# Patient Record
Sex: MALE | Race: WHITE | NOT HISPANIC OR LATINO | Employment: OTHER | URBAN - METROPOLITAN AREA
[De-identification: names, ages, dates, MRNs, and addresses within clinical notes are randomized per-mention and may not be internally consistent; named-entity substitution may affect disease eponyms.]

---

## 2023-05-30 ENCOUNTER — APPOINTMENT (OUTPATIENT)
Dept: CT IMAGING | Facility: HOSPITAL | Age: 68
End: 2023-05-30

## 2023-05-30 ENCOUNTER — HOSPITAL ENCOUNTER (EMERGENCY)
Facility: HOSPITAL | Age: 68
Discharge: HOME | End: 2023-05-30
Attending: EMERGENCY MEDICINE | Admitting: EMERGENCY MEDICINE

## 2023-05-30 VITALS
HEART RATE: 50 BPM | BODY MASS INDEX: 27.94 KG/M2 | TEMPERATURE: 96.9 F | HEIGHT: 67 IN | OXYGEN SATURATION: 96 % | RESPIRATION RATE: 16 BRPM | DIASTOLIC BLOOD PRESSURE: 87 MMHG | SYSTOLIC BLOOD PRESSURE: 154 MMHG | WEIGHT: 178 LBS

## 2023-05-30 DIAGNOSIS — R19.7 DIARRHEA OF PRESUMED INFECTIOUS ORIGIN: Primary | ICD-10-CM

## 2023-05-30 LAB
ALBUMIN SERPL-MCNC: 4.4 G/DL (ref 3.5–5.2)
ALBUMIN/GLOB SERPL: 1.5 G/DL
ALP SERPL-CCNC: 36 U/L (ref 39–117)
ALT SERPL W P-5'-P-CCNC: 26 U/L (ref 1–41)
ANION GAP SERPL CALCULATED.3IONS-SCNC: 8.6 MMOL/L (ref 5–15)
AST SERPL-CCNC: 21 U/L (ref 1–40)
BASOPHILS # BLD AUTO: 0.01 10*3/MM3 (ref 0–0.2)
BASOPHILS NFR BLD AUTO: 0.2 % (ref 0–1.5)
BILIRUB SERPL-MCNC: 0.5 MG/DL (ref 0–1.2)
BILIRUB UR QL STRIP: NEGATIVE
BUN SERPL-MCNC: 21 MG/DL (ref 8–23)
BUN/CREAT SERPL: 25.6 (ref 7–25)
CALCIUM SPEC-SCNC: 9.6 MG/DL (ref 8.6–10.5)
CHLORIDE SERPL-SCNC: 105 MMOL/L (ref 98–107)
CLARITY UR: CLEAR
CO2 SERPL-SCNC: 23.4 MMOL/L (ref 22–29)
COLOR UR: YELLOW
CREAT SERPL-MCNC: 0.82 MG/DL (ref 0.76–1.27)
DEPRECATED RDW RBC AUTO: 44 FL (ref 37–54)
EGFRCR SERPLBLD CKD-EPI 2021: 95.7 ML/MIN/1.73
EOSINOPHIL # BLD AUTO: 0.23 10*3/MM3 (ref 0–0.4)
EOSINOPHIL NFR BLD AUTO: 3.9 % (ref 0.3–6.2)
ERYTHROCYTE [DISTWIDTH] IN BLOOD BY AUTOMATED COUNT: 14 % (ref 12.3–15.4)
GLOBULIN UR ELPH-MCNC: 3 GM/DL
GLUCOSE SERPL-MCNC: 106 MG/DL (ref 65–99)
GLUCOSE UR STRIP-MCNC: NEGATIVE MG/DL
HCT VFR BLD AUTO: 46 % (ref 37.5–51)
HGB BLD-MCNC: 15.5 G/DL (ref 13–17.7)
HGB UR QL STRIP.AUTO: NEGATIVE
IMM GRANULOCYTES # BLD AUTO: 0.01 10*3/MM3 (ref 0–0.05)
IMM GRANULOCYTES NFR BLD AUTO: 0.2 % (ref 0–0.5)
KETONES UR QL STRIP: NEGATIVE
LEUKOCYTE ESTERASE UR QL STRIP.AUTO: NEGATIVE
LIPASE SERPL-CCNC: 20 U/L (ref 13–60)
LYMPHOCYTES # BLD AUTO: 1.74 10*3/MM3 (ref 0.7–3.1)
LYMPHOCYTES NFR BLD AUTO: 29.6 % (ref 19.6–45.3)
MCH RBC QN AUTO: 29 PG (ref 26.6–33)
MCHC RBC AUTO-ENTMCNC: 33.7 G/DL (ref 31.5–35.7)
MCV RBC AUTO: 86.1 FL (ref 79–97)
MONOCYTES # BLD AUTO: 0.44 10*3/MM3 (ref 0.1–0.9)
MONOCYTES NFR BLD AUTO: 7.5 % (ref 5–12)
NEUTROPHILS NFR BLD AUTO: 3.44 10*3/MM3 (ref 1.7–7)
NEUTROPHILS NFR BLD AUTO: 58.6 % (ref 42.7–76)
NITRITE UR QL STRIP: NEGATIVE
NRBC BLD AUTO-RTO: 0 /100 WBC (ref 0–0.2)
PH UR STRIP.AUTO: 5.5 [PH] (ref 5–8)
PLATELET # BLD AUTO: 179 10*3/MM3 (ref 140–450)
PMV BLD AUTO: 11.4 FL (ref 6–12)
POTASSIUM SERPL-SCNC: 4.4 MMOL/L (ref 3.5–5.2)
PROT SERPL-MCNC: 7.4 G/DL (ref 6–8.5)
PROT UR QL STRIP: NEGATIVE
RBC # BLD AUTO: 5.34 10*6/MM3 (ref 4.14–5.8)
SODIUM SERPL-SCNC: 137 MMOL/L (ref 136–145)
SP GR UR STRIP: 1.01 (ref 1–1.03)
UROBILINOGEN UR QL STRIP: NORMAL
WBC NRBC COR # BLD: 5.87 10*3/MM3 (ref 3.4–10.8)

## 2023-05-30 PROCEDURE — 74176 CT ABD & PELVIS W/O CONTRAST: CPT

## 2023-05-30 PROCEDURE — 80053 COMPREHEN METABOLIC PANEL: CPT | Performed by: PHYSICIAN ASSISTANT

## 2023-05-30 PROCEDURE — 83690 ASSAY OF LIPASE: CPT | Performed by: PHYSICIAN ASSISTANT

## 2023-05-30 PROCEDURE — 81003 URINALYSIS AUTO W/O SCOPE: CPT | Performed by: PHYSICIAN ASSISTANT

## 2023-05-30 PROCEDURE — 85025 COMPLETE CBC W/AUTO DIFF WBC: CPT | Performed by: PHYSICIAN ASSISTANT

## 2023-05-30 PROCEDURE — 99283 EMERGENCY DEPT VISIT LOW MDM: CPT

## 2023-05-30 RX ADMIN — SODIUM CHLORIDE 500 ML: 9 INJECTION, SOLUTION INTRAVENOUS at 11:01

## 2023-05-30 NOTE — DISCHARGE INSTRUCTIONS
Stay well-hydrated  Follow-up with your primary care doctor within 2 days for recheck  Schedule your colonoscopy with Dr. Interiano as he recommended  Return to the ER for worsening or as needed

## 2023-05-30 NOTE — ED PROVIDER NOTES
" EMERGENCY DEPARTMENT ENCOUNTER    Room Number:  01/01  Date seen:  5/30/2023  PCP: Yaquelin Ovalle MD  Discussed/ obtained information from independent historians: patient      HPI:  Chief Complaint: diarrhea  A complete HPI/ROS/PMH/PSH/SH/FH are unobtainable due to: none  Context: Andrea Zepeda is a 67 y.o. male who presents to the ED c/o severe watery diarrhea that started yesterday and has continued, recurs with any eating or drinking.  States is nonbloody, has some left sided abdominal discomfort and feels he has a \"knot\" in his left abdomen.  He denies any nausea vomiting, thinks he had a low-grade fever.  Denies urinary symptoms.  Has had multiple prior abdominal surgeries including for Hirschsprung's disease as a child   As well as an appendectomy.  He states he did eat at a fast food restaurant for breakfast but his family ate the same food and are not sick.  He denies any hospitalizations or antibiotics in the last 90 days.      External (non-ED) record review: Urgent care visit from 10/30/2017 Her wrist pain after a fall.  Right wrist x-rays showed no acute findings.  He was diagnosed with a short brain and strain of the right wrist and forearm.      PAST MEDICAL HISTORY  Active Ambulatory Problems     Diagnosis Date Noted   • No Active Ambulatory Problems     Resolved Ambulatory Problems     Diagnosis Date Noted   • No Resolved Ambulatory Problems     Past Medical History:   Diagnosis Date   • Hirschsprung's disease          PAST SURGICAL HISTORY  Past Surgical History:   Procedure Laterality Date   • APPENDECTOMY     • CYST REMOVAL      behind left ear and left cheek   • LAPAROSCOPIC ENDO-RECTAL PULL THROUGH FOR HIRSCHSPRUNG'S DISEASE     • NECK SURGERY           FAMILY HISTORY  History reviewed. No pertinent family history.      SOCIAL HISTORY  Social History     Socioeconomic History   • Marital status:    Tobacco Use   • Smoking status: Every Day   • Smokeless tobacco: Never   Substance " and Sexual Activity   • Alcohol use: Defer   • Drug use: Defer   • Sexual activity: Defer         ALLERGIES  Iodinated contrast media, Morphine and related, and Sulfa antibiotics        REVIEW OF SYSTEMS  Review of Systems         PHYSICAL EXAM  ED Triage Vitals [05/30/23 1007]   Temp Heart Rate Resp BP SpO2   96.9 °F (36.1 °C) 98 16 -- 96 %      Temp src Heart Rate Source Patient Position BP Location FiO2 (%)   Tympanic Monitor -- -- --       Physical Exam      GENERAL: Well-appearing, conversational, no acute distress  HENT: normocephalic, atraumatic  EYES: no scleral icterus  CV: regular rhythm, normal rate  RESPIRATORY: normal effort CTA B  ABDOMEN: nondistended soft, normal bowel sounds no guarding or rigidity, mild left abdominal tenderness  MUSCULOSKELETAL: no deformity  NEURO: alert, moves all extremities, follows commands  PSYCH:  calm, cooperative  SKIN: warm, dry    Vital signs and nursing notes reviewed.          LAB RESULTS  Recent Results (from the past 24 hour(s))   Comprehensive Metabolic Panel    Collection Time: 05/30/23 10:59 AM    Specimen: Blood   Result Value Ref Range    Glucose 106 (H) 65 - 99 mg/dL    BUN 21 8 - 23 mg/dL    Creatinine 0.82 0.76 - 1.27 mg/dL    Sodium 137 136 - 145 mmol/L    Potassium 4.4 3.5 - 5.2 mmol/L    Chloride 105 98 - 107 mmol/L    CO2 23.4 22.0 - 29.0 mmol/L    Calcium 9.6 8.6 - 10.5 mg/dL    Total Protein 7.4 6.0 - 8.5 g/dL    Albumin 4.4 3.5 - 5.2 g/dL    ALT (SGPT) 26 1 - 41 U/L    AST (SGOT) 21 1 - 40 U/L    Alkaline Phosphatase 36 (L) 39 - 117 U/L    Total Bilirubin 0.5 0.0 - 1.2 mg/dL    Globulin 3.0 gm/dL    A/G Ratio 1.5 g/dL    BUN/Creatinine Ratio 25.6 (H) 7.0 - 25.0    Anion Gap 8.6 5.0 - 15.0 mmol/L    eGFR 95.7 >60.0 mL/min/1.73   Lipase    Collection Time: 05/30/23 10:59 AM    Specimen: Blood   Result Value Ref Range    Lipase 20 13 - 60 U/L   CBC Auto Differential    Collection Time: 05/30/23 10:59 AM    Specimen: Blood   Result Value Ref Range    WBC  5.87 3.40 - 10.80 10*3/mm3    RBC 5.34 4.14 - 5.80 10*6/mm3    Hemoglobin 15.5 13.0 - 17.7 g/dL    Hematocrit 46.0 37.5 - 51.0 %    MCV 86.1 79.0 - 97.0 fL    MCH 29.0 26.6 - 33.0 pg    MCHC 33.7 31.5 - 35.7 g/dL    RDW 14.0 12.3 - 15.4 %    RDW-SD 44.0 37.0 - 54.0 fl    MPV 11.4 6.0 - 12.0 fL    Platelets 179 140 - 450 10*3/mm3    Neutrophil % 58.6 42.7 - 76.0 %    Lymphocyte % 29.6 19.6 - 45.3 %    Monocyte % 7.5 5.0 - 12.0 %    Eosinophil % 3.9 0.3 - 6.2 %    Basophil % 0.2 0.0 - 1.5 %    Immature Grans % 0.2 0.0 - 0.5 %    Neutrophils, Absolute 3.44 1.70 - 7.00 10*3/mm3    Lymphocytes, Absolute 1.74 0.70 - 3.10 10*3/mm3    Monocytes, Absolute 0.44 0.10 - 0.90 10*3/mm3    Eosinophils, Absolute 0.23 0.00 - 0.40 10*3/mm3    Basophils, Absolute 0.01 0.00 - 0.20 10*3/mm3    Immature Grans, Absolute 0.01 0.00 - 0.05 10*3/mm3    nRBC 0.0 0.0 - 0.2 /100 WBC   Urinalysis With Microscopic If Indicated (No Culture) - Urine, Clean Catch    Collection Time: 05/30/23 12:25 PM    Specimen: Urine, Clean Catch   Result Value Ref Range    Color, UA Yellow Yellow, Straw    Appearance, UA Clear Clear    pH, UA 5.5 5.0 - 8.0    Specific Gravity, UA 1.014 1.005 - 1.030    Glucose, UA Negative Negative    Ketones, UA Negative Negative    Bilirubin, UA Negative Negative    Blood, UA Negative Negative    Protein, UA Negative Negative    Leuk Esterase, UA Negative Negative    Nitrite, UA Negative Negative    Urobilinogen, UA 0.2 E.U./dL 0.2 - 1.0 E.U./dL       Ordered the above labs and reviewed the results.        RADIOLOGY  CT Abdomen Pelvis Without Contrast    Result Date: 5/30/2023  CT ABDOMEN AND PELVIS WITHOUT IV CONTRAST  HISTORY: 67-year-old male with left-sided abdominal pain and diarrhea. Hirschsprung's disease as a child status post rectal surgery. Appendectomy in the past as well.  TECHNIQUE: Radiation dose reduction techniques were utilized, including automated exposure control and exposure modulation based on body size. 3  mm images were obtained through the abdomen and pelvis without the administration of IV contrast. No priors for comparison.  FINDINGS: Noncontrasted liver, gallbladder, spleen, pancreas appear unremarkable. There is nodular hyperplasia of both adrenal glands. There is bilateral nephrolithiasis. There are no ureteral stones or hydronephrosis bilaterally. There are no stones within the urinary bladder. No acute bowel abnormalities seen. There is an average volume of formed stool within the colon. No definitive colonic thickening is seen. There is no free fluid or lymphadenopathy. There are moderately extensive abdominal aortic atherosclerotic changes and there is 3.1 cm ectasia at the infrarenal portion.      No acute abnormality is seen.        Ordered the above noted radiological studies. Reviewed by me in PACS.            PROCEDURES  Procedures              MEDICATIONS GIVEN IN ER  Medications   sodium chloride 0.9 % bolus 500 mL (0 mL Intravenous Stopped 5/30/23 1319)                   MEDICAL DECISION MAKING, PROGRESS, and CONSULTS    All labs have been independently reviewed by me.  All radiology studies have been reviewed by me and I have also reviewed the radiology report.   EKG's independently viewed and interpreted by me.  Discussion below represents my analysis of pertinent findings related to patient's condition, differential diagnosis, treatment plan and final disposition.      Additional sources:    - Chronic or social conditions impacting care: History of multiple prior abdominal surgeries related to Hirschsprung's disease          Orders placed during this visit:  Orders Placed This Encounter   Procedures   • Gastrointestinal Panel, PCR - Stool, Per Rectum   • CT Abdomen Pelvis Without Contrast   • Comprehensive Metabolic Panel   • Lipase   • Urinalysis With Microscopic If Indicated (No Culture) - Urine, Clean Catch   • CBC Auto Differential   • CBC & Differential           Differential  diagnosis:  Differential diagnosis includes but is not limited to:  - hepatobiliary pathology such as cholecystitis, cholangitis, and symptomatic cholelithiasis  - Pancreatitis  - Dyspepsia  - Small bowel obstruction  - Appendicitis  - Diverticulitis  - UTI including pyelonephritis  - Ureteral stone  - Zoster  - Colitis, including infectious and ischemic  - Atypical ACS        Additional orders considered but not ordered:  I considered admitting the patient however his work-up is unremarkable, he feels much better, he is tolerating p.o. without difficulty and has had no diarrhea over the course of his 5-hour ER course.      Independent interpretation of labs, radiology studies, and discussions with consultants:  ED Course as of 05/30/23 1503   Tue May 30, 2023   1240 Glucose(!): 106 [KA]   1240 Creatinine: 0.82 [KA]   1240 Lipase: 20 [KA]   1240 WBC: 5.87 [KA]   1240 Hemoglobin: 15.5 [KA]   1240 Nitrite, UA: Negative [KA]   1240 Blood, UA: Negative [KA]   1500 I reassessed the patient.  We reviewed all of his labs and imaging studies including all incidental findings.  He was unable to have a bowel movement in the emergency department.  CT shows no acute intra-abdominal findings, no dehydration, no CT or lab evidence of pancreatitis, abdomen is soft and nondistended with minimal lower abdominal tenderness, likely related to his diarrhea.  At this time he is quite comfortable with discharge, have encouraged him to recheck with his PCP in 2 days and return to the ER for any new or worsening symptoms or any concerns [KA]   1501 . [KA]      ED Course User Index  [KA] Rosita De Guzman PA             Patient was wearing a face mask when I entered the room and they continued to wear a mask throughout their stay in the ED.  I wore PPE, including  gloves, face mask with shield or face mask with goggles whenever I was in the room with patient.     DIAGNOSIS  Final diagnoses:   Diarrhea of presumed infectious origin            Follow Up:  Yaquelin Ovalle MD  [address] [facility] [address]  [phone]    In 2 days        RX:     Medication List      No changes were made to your prescriptions during this visit.         Latest Documented Vital Signs:  As of 15:03 EDT  BP- 154/87 HR- 50 Temp- 96.9 °F (36.1 °C) (Tympanic) O2 sat- 96%              --    Please note that portions of this were completed with a voice recognition program.       Note Disclaimer: At [facility], we believe that sharing information builds trust and better relationships. You are receiving this note because you are receiving care at [facility] or recently visited. It is possible you will see health information before a provider has talked with you about it. This kind of information can be easy to misunderstand. To help you fully understand what it means for your health, we urge you to discuss this note with your provider.           Rosita De Guzman PA  05/30/23 1504

## 2023-08-18 ENCOUNTER — APPOINTMENT (OUTPATIENT)
Dept: CT IMAGING | Facility: HOSPITAL | Age: 68
End: 2023-08-18
Payer: MEDICARE

## 2023-08-18 ENCOUNTER — HOSPITAL ENCOUNTER (OUTPATIENT)
Facility: HOSPITAL | Age: 68
Setting detail: OBSERVATION
LOS: 1 days | Discharge: HOME | End: 2023-08-21
Attending: EMERGENCY MEDICINE | Admitting: INTERNAL MEDICINE
Payer: MEDICARE

## 2023-08-18 DIAGNOSIS — K62.5 RECTAL BLEEDING: Primary | ICD-10-CM

## 2023-08-18 DIAGNOSIS — K52.9 COLITIS: ICD-10-CM

## 2023-08-18 LAB
ABO GROUP BLD: NORMAL
ALBUMIN SERPL-MCNC: 4.7 G/DL (ref 3.5–5.2)
ALBUMIN/GLOB SERPL: 2 G/DL
ALP SERPL-CCNC: 38 U/L (ref 39–117)
ALT SERPL W P-5'-P-CCNC: 30 U/L (ref 1–41)
ANION GAP SERPL CALCULATED.3IONS-SCNC: 10 MMOL/L (ref 5–15)
AST SERPL-CCNC: 27 U/L (ref 1–40)
BASOPHILS # BLD AUTO: 0.03 10*3/MM3 (ref 0–0.2)
BASOPHILS NFR BLD AUTO: 0.3 % (ref 0–1.5)
BILIRUB SERPL-MCNC: 0.6 MG/DL (ref 0–1.2)
BLD GP AB SCN SERPL QL: NEGATIVE
BUN SERPL-MCNC: 17 MG/DL (ref 8–23)
BUN/CREAT SERPL: 21.8 (ref 7–25)
CALCIUM SPEC-SCNC: 9.6 MG/DL (ref 8.6–10.5)
CHLORIDE SERPL-SCNC: 104 MMOL/L (ref 98–107)
CO2 SERPL-SCNC: 24 MMOL/L (ref 22–29)
CREAT SERPL-MCNC: 0.78 MG/DL (ref 0.76–1.27)
DEPRECATED RDW RBC AUTO: 42.8 FL (ref 37–54)
EGFRCR SERPLBLD CKD-EPI 2021: 97.1 ML/MIN/1.73
EOSINOPHIL # BLD AUTO: 0.21 10*3/MM3 (ref 0–0.4)
EOSINOPHIL NFR BLD AUTO: 2.1 % (ref 0.3–6.2)
ERYTHROCYTE [DISTWIDTH] IN BLOOD BY AUTOMATED COUNT: 13.4 % (ref 12.3–15.4)
FERRITIN SERPL-MCNC: 438 NG/ML (ref 30–400)
GLOBULIN UR ELPH-MCNC: 2.4 GM/DL
GLUCOSE SERPL-MCNC: 101 MG/DL (ref 65–99)
HCT VFR BLD AUTO: 46 % (ref 37.5–51)
HGB BLD-MCNC: 15.4 G/DL (ref 13–17.7)
HOLD SPECIMEN: NORMAL
HOLD SPECIMEN: NORMAL
IMM GRANULOCYTES # BLD AUTO: 0.03 10*3/MM3 (ref 0–0.05)
IMM GRANULOCYTES NFR BLD AUTO: 0.3 % (ref 0–0.5)
INR PPP: 1 (ref 0.9–1.1)
IRON 24H UR-MRATE: 58 MCG/DL (ref 59–158)
IRON SATN MFR SERPL: 17 % (ref 20–50)
LIPASE SERPL-CCNC: 13 U/L (ref 13–60)
LYMPHOCYTES # BLD AUTO: 2.11 10*3/MM3 (ref 0.7–3.1)
LYMPHOCYTES NFR BLD AUTO: 21.2 % (ref 19.6–45.3)
MCH RBC QN AUTO: 29.1 PG (ref 26.6–33)
MCHC RBC AUTO-ENTMCNC: 33.5 G/DL (ref 31.5–35.7)
MCV RBC AUTO: 86.8 FL (ref 79–97)
MONOCYTES # BLD AUTO: 0.78 10*3/MM3 (ref 0.1–0.9)
MONOCYTES NFR BLD AUTO: 7.9 % (ref 5–12)
NEUTROPHILS NFR BLD AUTO: 6.77 10*3/MM3 (ref 1.7–7)
NEUTROPHILS NFR BLD AUTO: 68.2 % (ref 42.7–76)
NRBC BLD AUTO-RTO: 0 /100 WBC (ref 0–0.2)
PLATELET # BLD AUTO: 192 10*3/MM3 (ref 140–450)
PMV BLD AUTO: 11.2 FL (ref 6–12)
POTASSIUM SERPL-SCNC: 3.7 MMOL/L (ref 3.5–5.2)
PROT SERPL-MCNC: 7.1 G/DL (ref 6–8.5)
PROTHROMBIN TIME: 13.3 SECONDS (ref 11.7–14.2)
RBC # BLD AUTO: 5.3 10*6/MM3 (ref 4.14–5.8)
RH BLD: NEGATIVE
SODIUM SERPL-SCNC: 138 MMOL/L (ref 136–145)
T&S EXPIRATION DATE: NORMAL
TIBC SERPL-MCNC: 334 MCG/DL (ref 298–536)
TRANSFERRIN SERPL-MCNC: 224 MG/DL (ref 200–360)
WBC NRBC COR # BLD: 9.93 10*3/MM3 (ref 3.4–10.8)
WHOLE BLOOD HOLD COAG: NORMAL
WHOLE BLOOD HOLD SPECIMEN: NORMAL

## 2023-08-18 PROCEDURE — 82728 ASSAY OF FERRITIN: CPT | Performed by: INTERNAL MEDICINE

## 2023-08-18 PROCEDURE — 84466 ASSAY OF TRANSFERRIN: CPT | Performed by: INTERNAL MEDICINE

## 2023-08-18 PROCEDURE — 74176 CT ABD & PELVIS W/O CONTRAST: CPT

## 2023-08-18 PROCEDURE — 99284 EMERGENCY DEPT VISIT MOD MDM: CPT

## 2023-08-18 PROCEDURE — 36415 COLL VENOUS BLD VENIPUNCTURE: CPT | Performed by: INTERNAL MEDICINE

## 2023-08-18 PROCEDURE — 86850 RBC ANTIBODY SCREEN: CPT | Performed by: EMERGENCY MEDICINE

## 2023-08-18 PROCEDURE — 96361 HYDRATE IV INFUSION ADD-ON: CPT

## 2023-08-18 PROCEDURE — 83690 ASSAY OF LIPASE: CPT | Performed by: EMERGENCY MEDICINE

## 2023-08-18 PROCEDURE — G0378 HOSPITAL OBSERVATION PER HR: HCPCS

## 2023-08-18 PROCEDURE — 83540 ASSAY OF IRON: CPT | Performed by: INTERNAL MEDICINE

## 2023-08-18 PROCEDURE — 85025 COMPLETE CBC W/AUTO DIFF WBC: CPT | Performed by: EMERGENCY MEDICINE

## 2023-08-18 PROCEDURE — 80053 COMPREHEN METABOLIC PANEL: CPT | Performed by: EMERGENCY MEDICINE

## 2023-08-18 PROCEDURE — 86901 BLOOD TYPING SEROLOGIC RH(D): CPT | Performed by: EMERGENCY MEDICINE

## 2023-08-18 PROCEDURE — 86900 BLOOD TYPING SEROLOGIC ABO: CPT | Performed by: EMERGENCY MEDICINE

## 2023-08-18 PROCEDURE — 85610 PROTHROMBIN TIME: CPT | Performed by: EMERGENCY MEDICINE

## 2023-08-18 RX ORDER — POLYETHYLENE GLYCOL 3350 17 G/17G
17 POWDER, FOR SOLUTION ORAL DAILY PRN
Status: DISCONTINUED | OUTPATIENT
Start: 2023-08-18 | End: 2023-08-21 | Stop reason: HOSPADM

## 2023-08-18 RX ORDER — METRONIDAZOLE 500 MG/100ML
500 INJECTION, SOLUTION INTRAVENOUS EVERY 8 HOURS
Status: DISCONTINUED | OUTPATIENT
Start: 2023-08-19 | End: 2023-08-21 | Stop reason: HOSPADM

## 2023-08-18 RX ORDER — SODIUM CHLORIDE 9 MG/ML
150 INJECTION, SOLUTION INTRAVENOUS CONTINUOUS
Status: DISCONTINUED | OUTPATIENT
Start: 2023-08-18 | End: 2023-08-18 | Stop reason: SDUPTHER

## 2023-08-18 RX ORDER — ACETAMINOPHEN 325 MG/1
650 TABLET ORAL EVERY 4 HOURS PRN
Status: DISCONTINUED | OUTPATIENT
Start: 2023-08-18 | End: 2023-08-21 | Stop reason: HOSPADM

## 2023-08-18 RX ORDER — BISACODYL 5 MG/1
5 TABLET, DELAYED RELEASE ORAL DAILY PRN
Status: DISCONTINUED | OUTPATIENT
Start: 2023-08-18 | End: 2023-08-21 | Stop reason: HOSPADM

## 2023-08-18 RX ORDER — BISACODYL 10 MG
10 SUPPOSITORY, RECTAL RECTAL DAILY PRN
Status: DISCONTINUED | OUTPATIENT
Start: 2023-08-18 | End: 2023-08-21 | Stop reason: HOSPADM

## 2023-08-18 RX ORDER — VALSARTAN 160 MG/1
160 TABLET ORAL
Status: DISCONTINUED | OUTPATIENT
Start: 2023-08-19 | End: 2023-08-19

## 2023-08-18 RX ORDER — ONDANSETRON 2 MG/ML
4 INJECTION INTRAMUSCULAR; INTRAVENOUS EVERY 6 HOURS PRN
Status: DISCONTINUED | OUTPATIENT
Start: 2023-08-18 | End: 2023-08-21 | Stop reason: HOSPADM

## 2023-08-18 RX ORDER — VALSARTAN 160 MG/1
TABLET ORAL
COMMUNITY
Start: 2023-07-03

## 2023-08-18 RX ORDER — SODIUM CHLORIDE 9 MG/ML
75 INJECTION, SOLUTION INTRAVENOUS CONTINUOUS
Status: DISCONTINUED | OUTPATIENT
Start: 2023-08-19 | End: 2023-08-20

## 2023-08-18 RX ORDER — UREA 10 %
3 LOTION (ML) TOPICAL NIGHTLY PRN
Status: DISCONTINUED | OUTPATIENT
Start: 2023-08-18 | End: 2023-08-21 | Stop reason: HOSPADM

## 2023-08-18 RX ORDER — AMOXICILLIN 250 MG
2 CAPSULE ORAL 2 TIMES DAILY
Status: DISCONTINUED | OUTPATIENT
Start: 2023-08-18 | End: 2023-08-21 | Stop reason: HOSPADM

## 2023-08-18 RX ORDER — ONDANSETRON 4 MG/1
4 TABLET, FILM COATED ORAL EVERY 6 HOURS PRN
Status: DISCONTINUED | OUTPATIENT
Start: 2023-08-18 | End: 2023-08-21 | Stop reason: HOSPADM

## 2023-08-18 RX ADMIN — SODIUM CHLORIDE 150 ML/HR: 9 INJECTION, SOLUTION INTRAVENOUS at 20:00

## 2023-08-18 RX ADMIN — SODIUM CHLORIDE 500 ML: 9 INJECTION, SOLUTION INTRAVENOUS at 13:09

## 2023-08-18 NOTE — ED NOTES
"Nursing report ED to floor  Andrea Zepeda  67 y.o.  male    HPI (triage note):   Andrea Zepeda is a 67 y.o. male who states that yesterday afternoon he began feeling poorly and went inside to rest when he developed left lower quadrant abdominal pain and cramping and then had multiple episodes of watery diarrhea last night.  He states that this morning he got up and had more crampy abdominal pain and then passed bright red blood per rectum.  The patient went to see his PCP this morning who sent him to the emergency room for evaluation.  He states that approximately 6 weeks ago Dr. Shaikh did a colonoscopy on him and was told it was \"normal\".  The patient denies a history of rectal bleeding, anticoagulants, diverticulitis or recent trauma.  He states as a child he had Hirschsprung's disease and had multiple abdominal surgeries in the past.  He denies fevers or chills.  He has had nausea but no vomiting.  He has loss of appetite.  The patient showed me a picture of of bright red blood with clots in his toilet that he took this morning.  Chief Complaint   Patient presents with    Diarrhea    Abdominal Pain       Admitting doctor:   Gayle Moody MD    Admitting diagnosis:   The primary encounter diagnosis was Rectal bleeding. A diagnosis of Colitis was also pertinent to this visit.    Code status:   Current Code Status       Date Active Code Status Order ID Comments User Context       Not on file            Allergies:   Iodinated contrast media, Morphine and related, and Sulfa antibiotics    Past Medical History:  Past Medical History:   Diagnosis Date    Hirschsprung's disease     Hypertension         Weight:       08/18/23  1154   Weight: 80.7 kg (178 lb)       Most recent vitals:   Vitals:    08/18/23 1432 08/18/23 1510 08/18/23 1539 08/18/23 1558   BP: 151/88 152/87 (!) 184/102    BP Location: Left arm      Patient Position:       Pulse: 57 54 63 55   Resp:       Temp:       TempSrc:       SpO2: 96% 96% " 97% 97%   Weight:       Height:           Active LDAs/IV Access:   Lines, Drains & Airways       Active LDAs       Name Placement date Placement time Site Days    Peripheral IV 08/18/23 1229 Right;Posterior Forearm 08/18/23  1229  Forearm  less than 1                    Labs (abnormal labs have a star):   Labs Reviewed   COMPREHENSIVE METABOLIC PANEL - Abnormal; Notable for the following components:       Result Value    Glucose 101 (*)     Alkaline Phosphatase 38 (*)     All other components within normal limits    Narrative:     GFR Normal >60  Chronic Kidney Disease <60  Kidney Failure <15     PROTIME-INR - Normal   LIPASE - Normal   CBC WITH AUTO DIFFERENTIAL - Normal   RAINBOW DRAW    Narrative:     The following orders were created for panel order [place] Draw.  Procedure                               Abnormality         Status                     ---------                               -----------         ------                     Green Top (Gel)[[id number]]                                  Final result               Lavender Top[[id number]]                                     Final result               Gold Top - SST[[id number]]                                   Final result               Light Blue Top[[id number]]                                   Final result                 Please view results for these tests on the individual orders.   TYPE AND SCREEN   GREEN TOP   LAVENDER TOP   GOLD TOP - SST   LIGHT BLUE TOP   CBC AND DIFFERENTIAL    Narrative:     The following orders were created for panel order CBC & Differential.  Procedure                               Abnormality         Status                     ---------                               -----------         ------                     CBC Auto Differential[[id number]]        Normal              Final result                 Please view results for these tests on the individual orders.       EKG:   No orders to display       Meds given in ED:    Medications   sodium chloride 0.9 % bolus 500 mL (0 mL Intravenous Stopped 8/18/23 1339)       Imaging results:  CT Abdomen Pelvis Without Contrast    Result Date: 8/18/2023  Focal moderate to severe colitis involving the splenic flexure and proximal most descending colon. Given focality and watershed area, ischemic colitis is of greatest concern. Evaluation is significantly limited without intravenous contrast. No pneumatosis, portal venous gas or free intraperitoneal air. Consider multiphase contrast-enhanced CT for further evaluation of the mesenteric vasculature.  Above findings were communicated to Dr. Toribio at 3:08 p.m. on 8/18/2023.  This report was finalized on 8/18/2023 3:08 PM by Dr. Teo Roman M.D.       Ambulatory status:   - stand by, uses walking stick    Social issues:   Social History     Socioeconomic History    Marital status:    Tobacco Use    Smoking status: Every Day    Smokeless tobacco: Never   Substance and Sexual Activity    Alcohol use: Defer    Drug use: Defer    Sexual activity: Defer          NIH Stroke Scale:         Ramses Kaye RN  08/18/23 16:05 EDT    Nurse Direct line for any questions: [id number]

## 2023-08-18 NOTE — ED NOTES
Patient presents for BRB in stools onset yesterday. One episode of BRB and one with darker blood. Recent colonoscopy in July with no findings. Denies blood thinners. Nauseated yesterday, but it has resolved. Denies recent fever.

## 2023-08-18 NOTE — ED PROVIDER NOTES
" EMERGENCY DEPARTMENT ENCOUNTER    Room Number:  29/29  Date seen:  8/18/2023  PCP: Yaquelin Ovalle MD  Historian: Patient      HPI:  Chief Complaint: Rectal bleeding  Context: Andrea Zepeda is a 67 y.o. male who states that yesterday afternoon he began feeling poorly and went inside to rest when he developed left lower quadrant abdominal pain and cramping and then had multiple episodes of watery diarrhea last night.  He states that this morning he got up and had more crampy abdominal pain and then passed bright red blood per rectum.  The patient went to see his PCP this morning who sent him to the emergency room for evaluation.  He states that approximately 6 weeks ago Dr. Shaikh did a colonoscopy on him and was told it was \"normal\".  The patient denies a history of rectal bleeding, anticoagulants, diverticulitis or recent trauma.  He states as a child he had Hirschsprung's disease and had multiple abdominal surgeries in the past.  He denies fevers or chills.  He has had nausea but no vomiting.  He has loss of appetite.  The patient showed me a picture of of bright red blood with clots in his toilet that he took this morning.      PAST MEDICAL HISTORY  Active Ambulatory Problems     Diagnosis Date Noted    No Active Ambulatory Problems     Resolved Ambulatory Problems     Diagnosis Date Noted    No Resolved Ambulatory Problems     Past Medical History:   Diagnosis Date    Hirschsprung's disease     Hypertension          REVIEW OF SYSTEMS  All systems reviewed and negative except for those discussed in HPI.       PAST SURGICAL HISTORY  Past Surgical History:   Procedure Laterality Date    APPENDECTOMY      CYST REMOVAL      behind left ear and left cheek    LAPAROSCOPIC ENDO-RECTAL PULL THROUGH FOR HIRSCHSPRUNG'S DISEASE      NECK SURGERY           FAMILY HISTORY  No family history on file.      SOCIAL HISTORY  Social History     Socioeconomic History    Marital status:    Tobacco Use    Smoking status: " Every Day    Smokeless tobacco: Never   Substance and Sexual Activity    Alcohol use: Defer    Drug use: Defer    Sexual activity: Defer         ALLERGIES  Iodinated contrast media, Morphine and related, and Sulfa antibiotics      PHYSICAL EXAM  ED Triage Vitals   Temp Heart Rate Resp BP SpO2   08/18/23 1154 08/18/23 1154 08/18/23 1154 08/18/23 1207 08/18/23 1154   97.6 øF (36.4 øC) 72 16 129/91 96 %      Temp src Heart Rate Source Patient Position BP Location FiO2 (%)   08/18/23 1154 08/18/23 1154 08/18/23 1207 08/18/23 1207 --   Tympanic Monitor Lying Right arm        Physical Exam      GENERAL: 59-year-old male in no acute distress  HENT: NCAT: nares patent: Neck supple  EYES: no scleral icterus  CV: regular rhythm, normal rate  RESPIRATORY: normal effort  ABDOMEN: soft, left lower quadrant tenderness but no guarding and diminished bowel sounds  MUSCULOSKELETAL: no deformity  NEURO: alert with nonfocal neuro exam  PSYCH:  calm, cooperative  SKIN: warm, dry    Vital signs and nursing notes reviewed.      LAB RESULTS  Recent Results (from the past 24 hour(s))   Green Top (Gel)    Collection Time: 08/18/23 12:30 PM   Result Value Ref Range    Extra Tube Hold for add-ons.    Lavender Top    Collection Time: 08/18/23 12:30 PM   Result Value Ref Range    Extra Tube hold for add-on    Gold Top - SST    Collection Time: 08/18/23 12:30 PM   Result Value Ref Range    Extra Tube Hold for add-ons.    Light Blue Top    Collection Time: 08/18/23 12:30 PM   Result Value Ref Range    Extra Tube Hold for add-ons.    Comprehensive Metabolic Panel    Collection Time: 08/18/23 12:30 PM    Specimen: Blood   Result Value Ref Range    Glucose 101 (H) 65 - 99 mg/dL    BUN 17 8 - 23 mg/dL    Creatinine 0.78 0.76 - 1.27 mg/dL    Sodium 138 136 - 145 mmol/L    Potassium 3.7 3.5 - 5.2 mmol/L    Chloride 104 98 - 107 mmol/L    CO2 24.0 22.0 - 29.0 mmol/L    Calcium 9.6 8.6 - 10.5 mg/dL    Total Protein 7.1 6.0 - 8.5 g/dL    Albumin 4.7 3.5 -  5.2 g/dL    ALT (SGPT) 30 1 - 41 U/L    AST (SGOT) 27 1 - 40 U/L    Alkaline Phosphatase 38 (L) 39 - 117 U/L    Total Bilirubin 0.6 0.0 - 1.2 mg/dL    Globulin 2.4 gm/dL    A/G Ratio 2.0 g/dL    BUN/Creatinine Ratio 21.8 7.0 - 25.0    Anion Gap 10.0 5.0 - 15.0 mmol/L    eGFR 97.1 >60.0 mL/min/1.73   Protime-INR    Collection Time: 08/18/23 12:30 PM    Specimen: Blood   Result Value Ref Range    Protime 13.3 11.7 - 14.2 Seconds    INR 1.00 0.90 - 1.10   Lipase    Collection Time: 08/18/23 12:30 PM    Specimen: Blood   Result Value Ref Range    Lipase 13 13 - 60 U/L   CBC Auto Differential    Collection Time: 08/18/23 12:30 PM    Specimen: Blood   Result Value Ref Range    WBC 9.93 3.40 - 10.80 10*3/mm3    RBC 5.30 4.14 - 5.80 10*6/mm3    Hemoglobin 15.4 13.0 - 17.7 g/dL    Hematocrit 46.0 37.5 - 51.0 %    MCV 86.8 79.0 - 97.0 fL    MCH 29.1 26.6 - 33.0 pg    MCHC 33.5 31.5 - 35.7 g/dL    RDW 13.4 12.3 - 15.4 %    RDW-SD 42.8 37.0 - 54.0 fl    MPV 11.2 6.0 - 12.0 fL    Platelets 192 140 - 450 10*3/mm3    Neutrophil % 68.2 42.7 - 76.0 %    Lymphocyte % 21.2 19.6 - 45.3 %    Monocyte % 7.9 5.0 - 12.0 %    Eosinophil % 2.1 0.3 - 6.2 %    Basophil % 0.3 0.0 - 1.5 %    Immature Grans % 0.3 0.0 - 0.5 %    Neutrophils, Absolute 6.77 1.70 - 7.00 10*3/mm3    Lymphocytes, Absolute 2.11 0.70 - 3.10 10*3/mm3    Monocytes, Absolute 0.78 0.10 - 0.90 10*3/mm3    Eosinophils, Absolute 0.21 0.00 - 0.40 10*3/mm3    Basophils, Absolute 0.03 0.00 - 0.20 10*3/mm3    Immature Grans, Absolute 0.03 0.00 - 0.05 10*3/mm3    nRBC 0.0 0.0 - 0.2 /100 WBC   Type & Screen    Collection Time: 08/18/23  1:10 PM    Specimen: Arm, Right; Blood   Result Value Ref Range    ABO Type A     RH type Negative     Antibody Screen Negative     T&S Expiration Date 8/21/2023 11:59:59 PM        Ordered the above labs and reviewed the results.        RADIOLOGY  CT Abdomen Pelvis Without Contrast    Result Date: 8/18/2023  CT ABDOMEN AND PELVIS WITHOUT IV CONTRAST   HISTORY: Bloody diarrhea  TECHNIQUE: Radiation dose reduction techniques were utilized, including automated exposure control and exposure modulation based on body size. 3 mm images were obtained through the abdomen and pelvis without the administration of IV contrast.  COMPARISON: CT abdomen pelvis 5/30/2023  FINDINGS: New focal confluent moderate to severe circumferential wall thickening involving the colonic splenic flexure and proximal most descending colon. Adjacent inflammatory changes in the mesentery. No pneumatosis, portal venous gas or free intraperitoneal air. Remaining bowel is normal in appearance. Severely atherosclerotic abdominal aorta with unchanged infrarenal ectasia (2.9 cm).  Stable small bilateral adrenal adenomas. Multiple bilateral nonobstructing renal calculi. Remaining solid organs are normal. No abdominal pelvic lymphadenopathy. No focal osseous lesion.      Focal moderate to severe colitis involving the splenic flexure and proximal most descending colon. Given focality and watershed area, ischemic colitis is of greatest concern. Evaluation is significantly limited without intravenous contrast. No pneumatosis, portal venous gas or free intraperitoneal air. Consider multiphase contrast-enhanced CT for further evaluation of the mesenteric vasculature.  Above findings were communicated to Dr. Toribio at 3:08 p.m. on 8/18/2023.  This report was finalized on 8/18/2023 3:08 PM by Dr. Teo Roman M.D.       Ordered the above noted radiological studies. Reviewed by me in PACS.            PROCEDURES  Procedures          MEDICATIONS GIVEN IN ER  Medications   sodium chloride 0.9 % bolus 500 mL (0 mL Intravenous Stopped 8/18/23 1339)             MEDICAL DECISION MAKING, PROGRESS, and CONSULTS    All labs have been independently reviewed by me.  All radiology studies have been reviewed by me and I have also reviewed the radiology report.   EKG's independently viewed and interpreted by me.   Discussion below represents my analysis of pertinent findings related to patient's condition, differential diagnosis, treatment plan and final disposition.      Additional sources:  - Shared decision making: After shared decision-making discussion to myself and the patient we agree he needs to be admitted to the hospital for further evaluation and care      Orders placed during this visit:  Orders Placed This Encounter   Procedures    CT Abdomen Pelvis Without Contrast    [place] Draw    Comprehensive Metabolic Panel    Protime-INR    Lipase    CBC Auto Differential    Monitor Blood Pressure    Pulse Oximetry, Continuous    [facility] (on-call MD unless specified) Details    Type & Screen    Initiate Observation Status    Green Top (Gel)    Lavender Top    Gold Top - SST    Light Blue Top    CBC & Differential         Differential diagnosis:  My differential diagnosis includes but is not limited to gastritis, pancreatitis, cholecystitis, appendicitis, diverticulitis, urinary tract infection, kidney stone, or bowel obstruction.        Independent interpretation of labs, radiology studies, and discussions with consultants:  ED Course as of 08/18/23 1741   Fri Aug 18, 2023   1303 The patient is not orthostatic and has normal vital signs at this time.  I will check labs and abdominal CT scan for further evaluation. [GP]   1357 The patient's white count and hemoglobin are normal. [GP]   1518 I discussed the patient's CT scan with the radiologist.  He states the patient does have significant colitis that could be ischemic in nature. [GP]   1518 I will admit the patient to the hospital for further evaluation and care. [GP]   1528 On repeat examination the patient states he is in no pain.  His vital signs are stable.  He had no further bleeding.  I advised him of his colitis and the need for admission to the hospital for further evaluation and care.  The patient understands and agrees with the plan. [GP]   1534 I discussed the  case with Dr. Moody from [facility].  She is aware of the patient's CT showing colitis that could be ischemic.  I have spoken with the patient about his contrast allergy and he tells me that the last time he had IV contrast that his throat swelled shut and he almost .  I have advised Dr. Moody of the above.  She will admit him to the hospital for further evaluation and care. [GP]      ED Course User Index  [GP] Bernardino Toribio MD               DIAGNOSIS  Final diagnoses:   Rectal bleeding   Colitis         DISPOSITION  ADMISSION    Discussed treatment plan and reason for admission with pt/family and admitting physician.  Pt/family voiced understanding of the plan for admission for further testing/treatment as needed.            Latest Documented Vital Signs:  As of 17:41 EDT  BP- 157/83 HR- 57 Temp- 97.6 øF (36.4 øC) (Tympanic) O2 sat- 98%--      --------------------  Please note that portions of this were completed with a voice recognition program.       Note Disclaimer: At [facility], we believe that sharing information builds trust and better relationships. You are receiving this note because you are receiving care at [facility] or recently visited. It is possible you will see health information before a provider has talked with you about it. This kind of information can be easy to misunderstand. To help you fully understand what it means for your health, we urge you to discuss this note with your provider.             Bernardino Toribio MD  [phone]

## 2023-08-19 ENCOUNTER — APPOINTMENT (OUTPATIENT)
Dept: CARDIOLOGY | Facility: HOSPITAL | Age: 68
End: 2023-08-19
Payer: MEDICARE

## 2023-08-19 PROBLEM — K52.9 COLITIS: Status: ACTIVE | Noted: 2023-08-19

## 2023-08-19 PROBLEM — I10 ESSENTIAL HYPERTENSION, BENIGN: Status: ACTIVE | Noted: 2023-08-19

## 2023-08-19 PROBLEM — R10.32 LLQ ABDOMINAL PAIN: Status: ACTIVE | Noted: 2023-08-19

## 2023-08-19 PROBLEM — Q43.1 HIRSCHSPRUNG'S DISEASE: Status: ACTIVE | Noted: 2023-08-19

## 2023-08-19 LAB
ANION GAP SERPL CALCULATED.3IONS-SCNC: 7.3 MMOL/L (ref 5–15)
BH CV VAS SMA AORTA PSV: 70 CM/S
BH CV VAS SMA CELIAC DIST EDV: 44 CM/S
BH CV VAS SMA CELIAC DIST PSV: 197 CM/S
BH CV VAS SMA CELIAC ORIGIN EDV: 49.5 CM/S
BH CV VAS SMA CELIAC ORIGIN PSV: 190 CM/S
BH CV VAS SMA CELIAC PROX EDV: 27.3 CM/S
BH CV VAS SMA CELIAC PROX PSV: 154 CM/S
BH CV VAS SMA HEPATIC EDV: 67 CM/S
BH CV VAS SMA HEPATIC PSV: 237 CM/S
BH CV VAS SMA IMA EDV: 36.7 CM/S
BH CV VAS SMA IMA PSV: 357 CM/S
BH CV VAS SMA ORIGIN EDV: 15.9 CM/S
BH CV VAS SMA ORIGIN PSV: 93.5 CM/S
BH CV VAS SMA SMA DIST EDV: 0 CM/S
BH CV VAS SMA SMA DIST PSV: 133 CM/S
BH CV VAS SMA SMA MID EDV: 20.8 CM/S
BH CV VAS SMA SMA MID PSV: 244 CM/S
BH CV VAS SMA SMA PROX EDV: 40.3 CM/S
BH CV VAS SMA SMA PROX PSV: 240 CM/S
BH CV VAS SMA SPLENIC EDV: 44 CM/S
BH CV VAS SMA SPLENIC PSV: 257 CM/S
BUN SERPL-MCNC: 16 MG/DL (ref 8–23)
BUN/CREAT SERPL: 23.2 (ref 7–25)
CALCIUM SPEC-SCNC: 8.6 MG/DL (ref 8.6–10.5)
CHLORIDE SERPL-SCNC: 108 MMOL/L (ref 98–107)
CO2 SERPL-SCNC: 23.7 MMOL/L (ref 22–29)
CREAT SERPL-MCNC: 0.69 MG/DL (ref 0.76–1.27)
D-LACTATE SERPL-SCNC: 0.9 MMOL/L (ref 0.5–2)
DEPRECATED RDW RBC AUTO: 41.4 FL (ref 37–54)
EGFRCR SERPLBLD CKD-EPI 2021: 100.8 ML/MIN/1.73
ERYTHROCYTE [DISTWIDTH] IN BLOOD BY AUTOMATED COUNT: 13 % (ref 12.3–15.4)
GLUCOSE SERPL-MCNC: 93 MG/DL (ref 65–99)
HCT VFR BLD AUTO: 41.1 % (ref 37.5–51)
HCT VFR BLD AUTO: 42.2 % (ref 37.5–51)
HGB BLD-MCNC: 13.5 G/DL (ref 13–17.7)
HGB BLD-MCNC: 14.1 G/DL (ref 13–17.7)
MCH RBC QN AUTO: 28.7 PG (ref 26.6–33)
MCHC RBC AUTO-ENTMCNC: 32.8 G/DL (ref 31.5–35.7)
MCV RBC AUTO: 87.4 FL (ref 79–97)
PLATELET # BLD AUTO: 164 10*3/MM3 (ref 140–450)
PMV BLD AUTO: 11.5 FL (ref 6–12)
POTASSIUM SERPL-SCNC: 3.7 MMOL/L (ref 3.5–5.2)
RBC # BLD AUTO: 4.7 10*6/MM3 (ref 4.14–5.8)
SODIUM SERPL-SCNC: 139 MMOL/L (ref 136–145)
WBC NRBC COR # BLD: 8.5 10*3/MM3 (ref 3.4–10.8)

## 2023-08-19 PROCEDURE — 93975 VASCULAR STUDY: CPT

## 2023-08-19 PROCEDURE — 87493 C DIFF AMPLIFIED PROBE: CPT | Performed by: INTERNAL MEDICINE

## 2023-08-19 PROCEDURE — 87507 IADNA-DNA/RNA PROBE TQ 12-25: CPT | Performed by: INTERNAL MEDICINE

## 2023-08-19 PROCEDURE — 25010000002 CEFTRIAXONE PER 250 MG: Performed by: INTERNAL MEDICINE

## 2023-08-19 PROCEDURE — 85018 HEMOGLOBIN: CPT | Performed by: INTERNAL MEDICINE

## 2023-08-19 PROCEDURE — G0378 HOSPITAL OBSERVATION PER HR: HCPCS

## 2023-08-19 PROCEDURE — 87040 BLOOD CULTURE FOR BACTERIA: CPT | Performed by: INTERNAL MEDICINE

## 2023-08-19 PROCEDURE — 99204 OFFICE O/P NEW MOD 45 MIN: CPT | Performed by: INTERNAL MEDICINE

## 2023-08-19 PROCEDURE — 85014 HEMATOCRIT: CPT | Performed by: INTERNAL MEDICINE

## 2023-08-19 PROCEDURE — 85027 COMPLETE CBC AUTOMATED: CPT | Performed by: INTERNAL MEDICINE

## 2023-08-19 PROCEDURE — 96361 HYDRATE IV INFUSION ADD-ON: CPT

## 2023-08-19 PROCEDURE — 96365 THER/PROPH/DIAG IV INF INIT: CPT

## 2023-08-19 PROCEDURE — 96367 TX/PROPH/DG ADDL SEQ IV INF: CPT

## 2023-08-19 PROCEDURE — 25010000002 METRONIDAZOLE 500 MG/100ML SOLUTION: Performed by: INTERNAL MEDICINE

## 2023-08-19 PROCEDURE — 83605 ASSAY OF LACTIC ACID: CPT | Performed by: INTERNAL MEDICINE

## 2023-08-19 PROCEDURE — 80048 BASIC METABOLIC PNL TOTAL CA: CPT | Performed by: INTERNAL MEDICINE

## 2023-08-19 RX ADMIN — CEFTRIAXONE SODIUM 1000 MG: 1 INJECTION, POWDER, FOR SOLUTION INTRAMUSCULAR; INTRAVENOUS at 23:27

## 2023-08-19 RX ADMIN — CEFTRIAXONE SODIUM 1000 MG: 1 INJECTION, POWDER, FOR SOLUTION INTRAMUSCULAR; INTRAVENOUS at 00:56

## 2023-08-19 RX ADMIN — METRONIDAZOLE 500 MG: 500 INJECTION, SOLUTION INTRAVENOUS at 09:03

## 2023-08-19 RX ADMIN — METRONIDAZOLE 500 MG: 500 INJECTION, SOLUTION INTRAVENOUS at 23:27

## 2023-08-19 RX ADMIN — METRONIDAZOLE 500 MG: 500 INJECTION, SOLUTION INTRAVENOUS at 01:26

## 2023-08-19 RX ADMIN — METRONIDAZOLE 500 MG: 500 INJECTION, SOLUTION INTRAVENOUS at 15:50

## 2023-08-19 RX ADMIN — SODIUM CHLORIDE 75 ML/HR: 9 INJECTION, SOLUTION INTRAVENOUS at 15:50

## 2023-08-19 RX ADMIN — SODIUM CHLORIDE 75 ML/HR: 9 INJECTION, SOLUTION INTRAVENOUS at 00:15

## 2023-08-19 NOTE — H&P
HISTORY AND PHYSICAL   [facility]        Date of Admission: 2023  Patient Identification:  Name: Andrea Zepeda  Age: 67 y.o.  Sex: male  :  1955  MRN: [id number]                     Primary Care Physician: Yaquelin Ovalle MD    Chief Complaint:  67 year old gentleman who presented to the emergency room with diarrhea and rectal bleeding; he has also had some left lower quadrant pain; he denies fever or chills; he has a history of hirschsprung's disease;     History of Present Illness:   a    Past Medical History:  Past Medical History:   Diagnosis Date    Hirschsprung's disease     Hypertension      Past Surgical History:  Past Surgical History:   Procedure Laterality Date    APPENDECTOMY      CYST REMOVAL      behind left ear and left cheek    LAPAROSCOPIC ENDO-RECTAL PULL THROUGH FOR HIRSCHSPRUNG'S DISEASE      NECK SURGERY        Home Meds:  Medications Prior to Admission   Medication Sig Dispense Refill Last Dose    valsartan (DIOVAN) 160 MG tablet TAKE 1 TABLET BY MOUTH ONE TIME EACH DAY.          Allergies:  Allergies   Allergen Reactions    Iodinated Contrast Media     Morphine And Related     Sulfa Antibiotics      Immunizations:  Immunization History   Administered Date(s) Administered    COVID-19 (MODERNA) 1st,2nd,3rd Dose Monovalent 2021, 2021    COVID-19 (MODERNA) Monovalent Original Booster 2021     Social History:   Social History     Social History Narrative    Not on file     Social History     Socioeconomic History    Marital status:    Tobacco Use    Smoking status: Former     Packs/day: 1.50     Years: 30.00     Pack years: 45.00     Types: Cigarettes     Start date:      Quit date:      Years since quittin.6    Smokeless tobacco: Never   Vaping Use    Vaping Use: Never used   Substance and Sexual Activity    Alcohol use: Defer    Drug use: Defer    Sexual activity: Defer       Family History:  History reviewed. No pertinent  "family history.     Review of Systems  See history of present illness and past medical history.  Patient denies headache, dizziness, syncope, falls, trauma, change in vision, change in hearing, change in taste, changes in weight, changes in appetite, focal weakness, numbness, or paresthesia.  Patient denies chest pain, palpitations, dyspnea, orthopnea, PND, cough, sinus pressure, rhinorrhea, epistaxis, hemoptysis, nausea, vomiting,hematemesis, constipation .  Denies cold or heat intolerance, polydipsia, polyuria, polyphagia. Denies hematuria, pyuria, dysuria, hesitancy, frequency or urgency. Denies consumption of raw and under cooked meats foods or change in water source.  Denies fever, chills, sweats, night sweats.  Denies missing any routine medications. Remainder of ROS is negative.    Objective:  T Max 24 hrs: Temp (24hrs), Av.9 øF (36.6 øC), Min:97.6 øF (36.4 øC), Max:98.2 øF (36.8 øC)    Vitals Ranges:   Temp:  [97.6 øF (36.4 øC)-98.2 øF (36.8 øC)] 98.2 øF (36.8 øC)  Heart Rate:  [54-77] 60  Resp:  [16] 16  BP: (120-184)/() 138/85      Exam:  /85 (BP Location: Right arm, Patient Position: Sitting)   Pulse 60   Temp 98.2 øF (36.8 øC) (Oral)   Resp 16   Ht 170.2 cm (67\")   Wt 80.7 kg (178 lb)   SpO2 99%   BMI 27.88 kg/mý     General Appearance:    Alert, cooperative, no distress, appears stated age   Head:    Normocephalic, without obvious abnormality, atraumatic   Eyes:    PERRL, conjunctivae/corneas clear, EOM's intact, both eyes   Ears:    Normal external ear canals, both ears   Nose:   Nares normal, septum midline, mucosa normal, no drainage    or sinus tenderness   Throat:   Lips, mucosa, and tongue normal   Neck:   Supple, symmetrical, trachea midline, no adenopathy;     thyroid:  no enlargement/tenderness/nodules; no carotid    bruit or JVD   Back:     Symmetric, no curvature, ROM normal, no CVA tenderness   Lungs:     Clear to auscultation bilaterally, respirations unlabored "   Chest Wall:    No tenderness or deformity    Heart:    Regular rate and rhythm, S1 and S2 normal, no murmur, rub   or gallop   Abdomen:     Soft, nontender, bowel sounds active all four quadrants,     no masses, no hepatomegaly, no splenomegaly   Extremities:   Extremities normal, atraumatic, no cyanosis or edema      .    Data Review:  Labs in chart were reviewed.  WBC   Date Value Ref Range Status   08/18/2023 9.93 3.40 - 10.80 10*3/mm3 Final     Hemoglobin   Date Value Ref Range Status   08/18/2023 15.4 13.0 - 17.7 g/dL Final     Hematocrit   Date Value Ref Range Status   08/18/2023 46.0 37.5 - 51.0 % Final     Platelets   Date Value Ref Range Status   08/18/2023 192 140 - 450 10*3/mm3 Final     Sodium   Date Value Ref Range Status   08/18/2023 138 136 - 145 mmol/L Final     Potassium   Date Value Ref Range Status   08/18/2023 3.7 3.5 - 5.2 mmol/L Final     Chloride   Date Value Ref Range Status   08/18/2023 104 98 - 107 mmol/L Final     CO2   Date Value Ref Range Status   08/18/2023 24.0 22.0 - 29.0 mmol/L Final     BUN   Date Value Ref Range Status   08/18/2023 17 8 - 23 mg/dL Final     Creatinine   Date Value Ref Range Status   08/18/2023 0.78 0.76 - 1.27 mg/dL Final     Glucose   Date Value Ref Range Status   08/18/2023 101 (H) 65 - 99 mg/dL Final     Calcium   Date Value Ref Range Status   08/18/2023 9.6 8.6 - 10.5 mg/dL Final     AST (SGOT)   Date Value Ref Range Status   08/18/2023 27 1 - 40 U/L Final     ALT (SGPT)   Date Value Ref Range Status   08/18/2023 30 1 - 41 U/L Final     Alkaline Phosphatase   Date Value Ref Range Status   08/18/2023 38 (L) 39 - 117 U/L Final                Imaging Results (All)       Procedure Component Value Units Date/Time    CT Abdomen Pelvis Without Contrast [[id number]] Collected: 08/18/23 1451     Updated: 08/18/23 1511    Narrative:      CT ABDOMEN AND PELVIS WITHOUT IV CONTRAST     HISTORY: Bloody diarrhea     TECHNIQUE: Radiation dose reduction techniques were  utilized, including  automated exposure control and exposure modulation based on body size.   3 mm images were obtained through the abdomen and pelvis without the  administration of IV contrast.      COMPARISON: CT abdomen pelvis 5/30/2023     FINDINGS: New focal confluent moderate to severe circumferential wall  thickening involving the colonic splenic flexure and proximal most  descending colon. Adjacent inflammatory changes in the mesentery. No  pneumatosis, portal venous gas or free intraperitoneal air. Remaining  bowel is normal in appearance. Severely atherosclerotic abdominal aorta  with unchanged infrarenal ectasia (2.9 cm).     Stable small bilateral adrenal adenomas. Multiple bilateral  nonobstructing renal calculi. Remaining solid organs are normal. No  abdominal pelvic lymphadenopathy. No focal osseous lesion.       Impression:      Focal moderate to severe colitis involving the splenic  flexure and proximal most descending colon. Given focality and watershed  area, ischemic colitis is of greatest concern. Evaluation is  significantly limited without intravenous contrast. No pneumatosis,  portal venous gas or free intraperitoneal air. Consider multiphase  contrast-enhanced CT for further evaluation of the mesenteric  vasculature.     Above findings were communicated to Dr. Toribio at 3:08 p.m. on  8/18/2023.     This report was finalized on 8/18/2023 3:08 PM by Dr. Teo Roman M.D.                 Assessment:  Active Hospital Problems    Diagnosis  POA    **Rectal bleeding [K62.5]  Yes      Resolved Hospital Problems   No resolved problems to display.   Colitis  Hirschsprungs disease  Left lower quadrant pain  hypertension    Plan:  Ask gi to see him  Recheck hgb  Monitor on telemetry  Antibiotics for colitis  Dw patient and ed provider as well as nurse    Gayle Moody MD  8/18/2023  23:04 EDT

## 2023-08-19 NOTE — CONSULTS
[facility]  Initial Inpatient Consult Note    Referring Provider: Dr Reza    Reason for Consultation: Colitis    Subjective     History of present illness:    67 y.o. male presenting to the emergency room complaining of rectal bleeding.  Symptom onset day before admission.  Is noted small amount of bright red blood with bowel movements.  He has had some crampy abdominal pain as well as some diarrhea.  A CT scan in the emergency room showed evidence of focal moderate to severe colitis involving the splenic flexure and proximal descending colon.  Concern for ischemic colitis given the watershed distribution.  Patient is status post recent colonoscopy 2 months ago with Dr. Shaikh patient tells me this was normal.  No recent antibiotic exposure.  White blood cell count is normal on admission.  Pt has anaphylactic allergy to IV contrast.     Past Medical History:  Past Medical History:   Diagnosis Date    Hirschsprung's disease     Hypertension      Past Surgical History:  Past Surgical History:   Procedure Laterality Date    APPENDECTOMY      CYST REMOVAL      behind left ear and left cheek    LAPAROSCOPIC ENDO-RECTAL PULL THROUGH FOR HIRSCHSPRUNG'S DISEASE      NECK SURGERY        Social History:   Social History     Tobacco Use    Smoking status: Former     Packs/day: 1.50     Years: 30.00     Pack years: 45.00     Types: Cigarettes     Start date:      Quit date:      Years since quittin.6    Smokeless tobacco: Never   Substance Use Topics    Alcohol use: Defer      Family History:  History reviewed. No pertinent family history.    Home Meds:  Medications Prior to Admission   Medication Sig Dispense Refill Last Dose    valsartan (DIOVAN) 160 MG tablet TAKE 1 TABLET BY MOUTH ONE TIME EACH DAY.        Current Meds:   cefTRIAXone, 1,000 mg, Intravenous, Q24H  metroNIDAZOLE, 500 mg, Intravenous, Q8H  senna-docusate sodium, 2 tablet, Oral, BID      Allergies:  Allergies   Allergen  "Reactions    Iodinated Contrast Media Anaphylaxis     \"Throat swelled shut and I almost \"    Morphine And Related     Sulfa Antibiotics        Objective     Vital Signs  Temp:  [97.7 øF (36.5 øC)-98.2 øF (36.8 øC)] 98.1 øF (36.7 øC)  Heart Rate:  [54-68] 60  Resp:  [16-18] 18  BP: (116-184)/() 116/81  Physical Exam:  General Appearance:     Alert, cooperative, in no acute distress   Abdomen:     Normal bowel sounds, no masses, no organomegaly, soft     nontender, nondistended, no guarding, no rebound                 tenderness   Rectal:     Deferred       Results Review:   I reviewed the patient's new clinical results.  I reviewed the patient's new imaging results and agree with the interpretation.    Results from last 7 days   Lab Units 23  0419 23  0055 23  1230   WBC 10*3/mm3 8.50  --  9.93   HEMOGLOBIN g/dL 13.5 14.1 15.4   HEMATOCRIT % 41.1 42.2 46.0   PLATELETS 10*3/mm3 164  --  192     Results from last 7 days   Lab Units 23  0419 23  1230   SODIUM mmol/L 139 138   POTASSIUM mmol/L 3.7 3.7   CHLORIDE mmol/L 108* 104   CO2 mmol/L 23.7 24.0   BUN mg/dL 16 17   CREATININE mg/dL 0.69* 0.78   CALCIUM mg/dL 8.6 9.6   BILIRUBIN mg/dL  --  0.6   ALK PHOS U/L  --  38*   ALT (SGPT) U/L  --  30   AST (SGOT) U/L  --  27   GLUCOSE mg/dL 93 101*     Results from last 7 days   Lab Units 23  1230   INR  1.00     Lab Results   Lab Value Date/Time    LIPASE 13 2023 1230    LIPASE 20 2023 1059       Radiology:  CT Abdomen Pelvis Without Contrast   Final Result   Focal moderate to severe colitis involving the splenic   flexure and proximal most descending colon. Given focality and watershed   area, ischemic colitis is of greatest concern. Evaluation is   significantly limited without intravenous contrast. No pneumatosis,   portal venous gas or free intraperitoneal air. Consider multiphase   contrast-enhanced CT for further evaluation of the mesenteric   vasculature.     "   Above findings were communicated to Dr. Toribio at 3:08 p.m. on   8/18/2023.       This report was finalized on 8/18/2023 3:08 PM by Dr. Teo Roman M.D.              Assessment & Plan   Assessment:    Colitis - possible ischemic vs infectious  Recent normal colonoscopy  IV contrast allergy    Plan:   Ideally would like to refer the patient for CT angiogram of his abdomen but given his reported anaphylactic allergy to IV contrast will start with a mesenteric Doppler.  Stool studies including a GI PCR and C. difficile have been ordered as well.  I would continue with IV abx for the time being.      I discussed the patients findings and my recommendations with patient.         Valentin Otoole M.D.  [facility]  [address] [place] [address]  Office: [phone]

## 2023-08-19 NOTE — PLAN OF CARE
Goal Outcome Evaluation:              Outcome Evaluation: VSS. HAD A SMALL AMT. BRIGHT AND DARK RED IN THE BOTTOM OF TOILET X 1 WHEN ADMITTED TO FLOOR.  IS ABLE TO REST WELL WHEN HE SLEEPS ON HIS RIGHT SIDE BUT SAID IT HURTS HIS LEFT LOWER ABDOMEN WHEN HE LAYS ON LEFT SIDE.

## 2023-08-19 NOTE — PROGRESS NOTES
Name: Andrea Zepeda ADMIT: 2023   : 1955  PCP: Yaquelin Ovalle MD    MRN: [id number] LOS: 0 days   AGE/SEX: 67 y.o. male  ROOM: [facility]     Subjective   Subjective   Up in his recliner, patient reports a long surgical history when he was a child secondary to Hirschprungs dz. Concerned about holding his BP meds but ok after hearing reasoning (normal BP this am in the setting of GI bleed)        Objective   Objective   Vital Signs  Temp:  [96.4 øF (35.8 øC)-98.2 øF (36.8 øC)] 96.4 øF (35.8 øC)  Heart Rate:  [54-68] 68  Resp:  [16-18] 18  BP: (116-184)/() 140/80  SpO2:  [95 %-99 %] 96 %  on   ;   Device (Oxygen Therapy): room air  Body mass index is 27.88 kg/mý.  Physical Exam  Constitutional:       General: He is not in acute distress.     Appearance: He is not toxic-appearing.   Cardiovascular:      Rate and Rhythm: Normal rate and regular rhythm.   Pulmonary:      Effort: Pulmonary effort is normal. No respiratory distress.      Breath sounds: Normal breath sounds. No wheezing.   Abdominal:      General: Abdomen is flat. Bowel sounds are normal. There is no distension.      Palpations: Abdomen is soft.      Tenderness: There is abdominal tenderness.   Musculoskeletal:         General: No tenderness.      Right lower leg: No edema.      Left lower leg: No edema.   Skin:     General: Skin is warm and dry.   Neurological:      General: No focal deficit present.      Mental Status: He is alert and oriented to person, place, and time. Mental status is at baseline.      Motor: No weakness.       Results Review     I reviewed the patient's new clinical results.  Results from last 7 days   Lab Units 23  0419 23  0055 23  1230   WBC 10*3/mm3 8.50  --  9.93   HEMOGLOBIN g/dL 13.5 14.1 15.4   PLATELETS 10*3/mm3 164  --  192     Results from last 7 days   Lab Units 23  0419 23  1230   SODIUM mmol/L 139 138   POTASSIUM mmol/L 3.7 3.7   CHLORIDE mmol/L 108* 104   CO2  mmol/L 23.7 24.0   BUN mg/dL 16 17   CREATININE mg/dL 0.69* 0.78   GLUCOSE mg/dL 93 101*   Estimated Creatinine Clearance: 104.2 mL/min (A) (by C-G formula based on SCr of 0.69 mg/dL (L)).  Results from last 7 days   Lab Units 08/18/23  1230   ALBUMIN g/dL 4.7   BILIRUBIN mg/dL 0.6   ALK PHOS U/L 38*   AST (SGOT) U/L 27   ALT (SGPT) U/L 30     Results from last 7 days   Lab Units 08/19/23  0419 08/18/23  1230   CALCIUM mg/dL 8.6 9.6   ALBUMIN g/dL  --  4.7     Results from last 7 days   Lab Units 08/19/23  0055   LACTATE mmol/L 0.9   No results found for: COVID19  No results found for: HGBA1C, POCGLU    CT Abdomen Pelvis Without Contrast  Narrative: CT ABDOMEN AND PELVIS WITHOUT IV CONTRAST     HISTORY: Bloody diarrhea     TECHNIQUE: Radiation dose reduction techniques were utilized, including  automated exposure control and exposure modulation based on body size.   3 mm images were obtained through the abdomen and pelvis without the  administration of IV contrast.      COMPARISON: CT abdomen pelvis 5/30/2023     FINDINGS: New focal confluent moderate to severe circumferential wall  thickening involving the colonic splenic flexure and proximal most  descending colon. Adjacent inflammatory changes in the mesentery. No  pneumatosis, portal venous gas or free intraperitoneal air. Remaining  bowel is normal in appearance. Severely atherosclerotic abdominal aorta  with unchanged infrarenal ectasia (2.9 cm).     Stable small bilateral adrenal adenomas. Multiple bilateral  nonobstructing renal calculi. Remaining solid organs are normal. No  abdominal pelvic lymphadenopathy. No focal osseous lesion.     Impression: Focal moderate to severe colitis involving the splenic  flexure and proximal most descending colon. Given focality and watershed  area, ischemic colitis is of greatest concern. Evaluation is  significantly limited without intravenous contrast. No pneumatosis,  portal venous gas or free intraperitoneal air.  Consider multiphase  contrast-enhanced CT for further evaluation of the mesenteric  vasculature.     Above findings were communicated to Dr. Toribio at 3:08 p.m. on  8/18/2023.     This report was finalized on 8/18/2023 3:08 PM by Dr. Teo Roman M.D.       Scheduled Medications  cefTRIAXone, 1,000 mg, Intravenous, Q24H  metroNIDAZOLE, 500 mg, Intravenous, Q8H  senna-docusate sodium, 2 tablet, Oral, BID    Infusions  sodium chloride, 75 mL/hr, Last Rate: 75 mL/hr (08/19/23 0827)    Diet  NPO Diet NPO Type: Strict NPO         I have personally reviewed:  [x]  Laboratory   []  Microbiology   [x]  Radiology   [x]  EKG/Telemetry   []  Cardiology/Vascular   []  Pathology   [x]  Records    Assessment/Plan     Active Hospital Problems    Diagnosis  POA    **Colitis [K52.9]  Yes    Hirschsprung's disease [Q43.1]  Not Applicable    LLQ abdominal pain [R10.32]  Yes    Essential hypertension, benign [I10]  Yes    Rectal bleeding [K62.5]  Yes      Resolved Hospital Problems   No resolved problems to display.       67 y.o. male admitted with Colitis.    Colitis  Rectal bleeding/diarrhea  Hirschprung's disease  Left lower quadrant pain  - CT reviewed, colitis area suspicious for ischemic cause given location  - GI consulted, pt w/ anaphylaxis to CT contrast so mesenteric duplex has been ordered  - continue rocephin/flagyl; stool studies pending    Hypertension  - ARB resumed on admission  - DC for now, pt with normal BP, can resume if indicated    SCDs for DVT prophylaxis.  Full code.  Discussed with patient and nursing staff.  Anticipate discharge home timing yet to be determined.      Claudia Reza MD  [facility]  08/19/23  14:14 EDT    I wore protective equipment throughout this patient encounter including gloves.  Hand hygiene was performed before donning protective equipment and after removal when leaving the room.         Copied text in this note has been reviewed and is accurate as of  08/19/23.

## 2023-08-20 LAB
ADV 40+41 DNA STL QL NAA+NON-PROBE: NOT DETECTED
ANION GAP SERPL CALCULATED.3IONS-SCNC: 9.5 MMOL/L (ref 5–15)
ASTRO TYP 1-8 RNA STL QL NAA+NON-PROBE: NOT DETECTED
BASOPHILS # BLD AUTO: 0.03 10*3/MM3 (ref 0–0.2)
BASOPHILS NFR BLD AUTO: 0.4 % (ref 0–1.5)
BUN SERPL-MCNC: 12 MG/DL (ref 8–23)
BUN/CREAT SERPL: 17.4 (ref 7–25)
C CAYETANENSIS DNA STL QL NAA+NON-PROBE: NOT DETECTED
C COLI+JEJ+UPSA DNA STL QL NAA+NON-PROBE: NOT DETECTED
C DIFF TOX GENS STL QL NAA+PROBE: NEGATIVE
CALCIUM SPEC-SCNC: 8.6 MG/DL (ref 8.6–10.5)
CHLORIDE SERPL-SCNC: 109 MMOL/L (ref 98–107)
CO2 SERPL-SCNC: 21.5 MMOL/L (ref 22–29)
CREAT SERPL-MCNC: 0.69 MG/DL (ref 0.76–1.27)
CRYPTOSP DNA STL QL NAA+NON-PROBE: NOT DETECTED
DEPRECATED RDW RBC AUTO: 42.7 FL (ref 37–54)
E HISTOLYT DNA STL QL NAA+NON-PROBE: NOT DETECTED
EAEC PAA PLAS AGGR+AATA ST NAA+NON-PRB: NOT DETECTED
EC STX1+STX2 GENES STL QL NAA+NON-PROBE: NOT DETECTED
EGFRCR SERPLBLD CKD-EPI 2021: 100.8 ML/MIN/1.73
EOSINOPHIL # BLD AUTO: 0.28 10*3/MM3 (ref 0–0.4)
EOSINOPHIL NFR BLD AUTO: 3.8 % (ref 0.3–6.2)
EPEC EAE GENE STL QL NAA+NON-PROBE: NOT DETECTED
ERYTHROCYTE [DISTWIDTH] IN BLOOD BY AUTOMATED COUNT: 13.2 % (ref 12.3–15.4)
ETEC LTA+ST1A+ST1B TOX ST NAA+NON-PROBE: NOT DETECTED
G LAMBLIA DNA STL QL NAA+NON-PROBE: NOT DETECTED
GLUCOSE SERPL-MCNC: 79 MG/DL (ref 65–99)
HCT VFR BLD AUTO: 39.8 % (ref 37.5–51)
HGB BLD-MCNC: 13.2 G/DL (ref 13–17.7)
IMM GRANULOCYTES # BLD AUTO: 0.02 10*3/MM3 (ref 0–0.05)
IMM GRANULOCYTES NFR BLD AUTO: 0.3 % (ref 0–0.5)
LYMPHOCYTES # BLD AUTO: 2.16 10*3/MM3 (ref 0.7–3.1)
LYMPHOCYTES NFR BLD AUTO: 29.2 % (ref 19.6–45.3)
MAGNESIUM SERPL-MCNC: 2.2 MG/DL (ref 1.6–2.4)
MCH RBC QN AUTO: 29.2 PG (ref 26.6–33)
MCHC RBC AUTO-ENTMCNC: 33.2 G/DL (ref 31.5–35.7)
MCV RBC AUTO: 88.1 FL (ref 79–97)
MONOCYTES # BLD AUTO: 0.56 10*3/MM3 (ref 0.1–0.9)
MONOCYTES NFR BLD AUTO: 7.6 % (ref 5–12)
NEUTROPHILS NFR BLD AUTO: 4.35 10*3/MM3 (ref 1.7–7)
NEUTROPHILS NFR BLD AUTO: 58.7 % (ref 42.7–76)
NOROVIRUS GI+II RNA STL QL NAA+NON-PROBE: NOT DETECTED
NRBC BLD AUTO-RTO: 0 /100 WBC (ref 0–0.2)
P SHIGELLOIDES DNA STL QL NAA+NON-PROBE: NOT DETECTED
PHOSPHATE SERPL-MCNC: 2.2 MG/DL (ref 2.5–4.5)
PLATELET # BLD AUTO: 156 10*3/MM3 (ref 140–450)
PMV BLD AUTO: 11.2 FL (ref 6–12)
POTASSIUM SERPL-SCNC: 3.7 MMOL/L (ref 3.5–5.2)
RBC # BLD AUTO: 4.52 10*6/MM3 (ref 4.14–5.8)
RVA RNA STL QL NAA+NON-PROBE: NOT DETECTED
S ENT+BONG DNA STL QL NAA+NON-PROBE: NOT DETECTED
SAPO I+II+IV+V RNA STL QL NAA+NON-PROBE: NOT DETECTED
SHIGELLA SP+EIEC IPAH ST NAA+NON-PROBE: NOT DETECTED
SODIUM SERPL-SCNC: 140 MMOL/L (ref 136–145)
V CHOL+PARA+VUL DNA STL QL NAA+NON-PROBE: NOT DETECTED
V CHOLERAE DNA STL QL NAA+NON-PROBE: NOT DETECTED
WBC NRBC COR # BLD: 7.4 10*3/MM3 (ref 3.4–10.8)
Y ENTEROCOL DNA STL QL NAA+NON-PROBE: NOT DETECTED

## 2023-08-20 PROCEDURE — 25010000002 METRONIDAZOLE 500 MG/100ML SOLUTION: Performed by: INTERNAL MEDICINE

## 2023-08-20 PROCEDURE — 96361 HYDRATE IV INFUSION ADD-ON: CPT

## 2023-08-20 PROCEDURE — 96366 THER/PROPH/DIAG IV INF ADDON: CPT

## 2023-08-20 PROCEDURE — 80048 BASIC METABOLIC PNL TOTAL CA: CPT | Performed by: STUDENT IN AN ORGANIZED HEALTH CARE EDUCATION/TRAINING PROGRAM

## 2023-08-20 PROCEDURE — 83735 ASSAY OF MAGNESIUM: CPT | Performed by: STUDENT IN AN ORGANIZED HEALTH CARE EDUCATION/TRAINING PROGRAM

## 2023-08-20 PROCEDURE — G0378 HOSPITAL OBSERVATION PER HR: HCPCS

## 2023-08-20 PROCEDURE — 85025 COMPLETE CBC W/AUTO DIFF WBC: CPT | Performed by: STUDENT IN AN ORGANIZED HEALTH CARE EDUCATION/TRAINING PROGRAM

## 2023-08-20 PROCEDURE — 99232 SBSQ HOSP IP/OBS MODERATE 35: CPT | Performed by: NURSE PRACTITIONER

## 2023-08-20 PROCEDURE — 84100 ASSAY OF PHOSPHORUS: CPT | Performed by: STUDENT IN AN ORGANIZED HEALTH CARE EDUCATION/TRAINING PROGRAM

## 2023-08-20 RX ORDER — VALSARTAN 160 MG/1
160 TABLET ORAL
Status: DISCONTINUED | OUTPATIENT
Start: 2023-08-20 | End: 2023-08-21 | Stop reason: HOSPADM

## 2023-08-20 RX ADMIN — METRONIDAZOLE 500 MG: 500 INJECTION, SOLUTION INTRAVENOUS at 08:38

## 2023-08-20 RX ADMIN — VALSARTAN 160 MG: 160 TABLET, FILM COATED ORAL at 10:23

## 2023-08-20 NOTE — PROGRESS NOTES
Gastroenterology   Inpatient Progress Note    Reason for Follow Up: Colitis    Subjective  Interval History:   Patient reports left-sided abdominal discomfort was worse when he woke up but has improved and is currently mild.  He is tolerating clear liquid diet.    He is interested in advancing his diet.      Current Facility-Administered Medications:     acetaminophen (TYLENOL) tablet 650 mg, 650 mg, Oral, Q4H PRN, Gayle Moody MD    sennosides-docusate (PERICOLACE) 8.6-50 MG per tablet 2 tablet, 2 tablet, Oral, BID **AND** polyethylene glycol (MIRALAX) packet 17 g, 17 g, Oral, Daily PRN **AND** bisacodyl (DULCOLAX) EC tablet 5 mg, 5 mg, Oral, Daily PRN **AND** bisacodyl (DULCOLAX) suppository 10 mg, 10 mg, Rectal, Daily PRN, Gayle Moody MD    cefTRIAXone (ROCEPHIN) 1,000 mg in sodium chloride 0.9 % 100 mL IVPB-VTB, 1,000 mg, Intravenous, Q24H, Gayle Moody MD, Last Rate: 200 mL/hr at 08/19/23 2327, 1,000 mg at 08/19/23 2327    melatonin tablet 3 mg, 3 mg, Oral, Nightly PRN, Gayle Moody MD    metroNIDAZOLE (FLAGYL) IVPB 500 mg, 500 mg, Intravenous, Q8H, Gayle Moody MD, Last Rate: 100 mL/hr at 08/19/23 2327, 500 mg at 08/19/23 2327    ondansetron (ZOFRAN) tablet 4 mg, 4 mg, Oral, Q6H PRN **OR** ondansetron (ZOFRAN) injection 4 mg, 4 mg, Intravenous, Q6H PRN, Gayle Moody MD    sodium chloride 0.9 % infusion, 75 mL/hr, Intravenous, Continuous, Gayle Moody MD, Last Rate: 75 mL/hr at 08/19/23 1550, 75 mL/hr at 08/19/23 1550  Review of Systems:                Gastroenterology positive for left-sided abdominal discomfort    Objective     Vital Signs  Temp:  [96.4 øF (35.8 øC)-97.9 øF (36.6 øC)] 97.7 øF (36.5 øC)  Heart Rate:  [56-68] 56  Resp:  [18] 18  BP: (140-176)/(80-98) 176/98  Body mass index is 27.88 kg/mý.                  Physical Exam:              General: patient awake, alert and cooperative              Eyes: no scleral icterus               Skin: warm and dry, not jaundiced              Abdomen: soft, normal bowel sounds, nontender, nondistended              Psychiatric: Appropriate affect and behavior                Results Review:                I reviewed the patient's new clinical results.    Results from last 7 days   Lab Units 08/20/23  0344 08/19/23  0419 08/19/23  0055 08/18/23  1230   WBC 10*3/mm3 7.40 8.50  --  9.93   HEMOGLOBIN g/dL 13.2 13.5 14.1 15.4   HEMATOCRIT % 39.8 41.1 42.2 46.0   PLATELETS 10*3/mm3 156 164  --  192     Results from last 7 days   Lab Units 08/20/23  0344 08/19/23  0419 08/18/23  1230   SODIUM mmol/L 140 139 138   POTASSIUM mmol/L 3.7 3.7 3.7   CHLORIDE mmol/L 109* 108* 104   CO2 mmol/L 21.5* 23.7 24.0   BUN mg/dL 12 16 17   CREATININE mg/dL 0.69* 0.69* 0.78   CALCIUM mg/dL 8.6 8.6 9.6   BILIRUBIN mg/dL  --   --  0.6   ALK PHOS U/L  --   --  38*   ALT (SGPT) U/L  --   --  30   AST (SGOT) U/L  --   --  27   GLUCOSE mg/dL 79 93 101*     Results from last 7 days   Lab Units 08/18/23  1230   INR  1.00     Lab Results   Lab Value Date/Time    LIPASE 13 08/18/2023 1230    LIPASE 20 05/30/2023 1059       Radiology:  CT Abdomen Pelvis Without Contrast   Final Result   Focal moderate to severe colitis involving the splenic   flexure and proximal most descending colon. Given focality and watershed   area, ischemic colitis is of greatest concern. Evaluation is   significantly limited without intravenous contrast. No pneumatosis,   portal venous gas or free intraperitoneal air. Consider multiphase   contrast-enhanced CT for further evaluation of the mesenteric   vasculature.       Above findings were communicated to Dr. Toribio at 3:08 p.m. on   8/18/2023.       This report was finalized on 8/18/2023 3:08 PM by Dr. Teo Roman M.D.              Assessment & Plan     Active Hospital Problems    Diagnosis     **Colitis     Hirschsprung's disease     LLQ abdominal pain     Essential hypertension, benign     Rectal bleeding         Assessment:  Colitis-ischemic versus infectious, infectious etiology less likely, C. difficile and GI PCR panel negative  Recent normal colonoscopy  IV contrast allergy anaphylaxis  Constipation        Plan:  Given anaphylactic allergy to IV contrast, mesenteric Doppler ultrasound ordered  Advance diet  Avoid dehydration or hypovolemia  Continue bowel regimen to promote complete and regular evacuation  Patient is interested in outpatient colonoscopy with his established surgeon, Dr. Lanier approximately 12 weeks after discharge for reassessment of area    I discussed the patients findings and my recommendations with patient and nursing staff.           Phyllis JOHNSTON  [facility] [place]  [address] [place] [address]

## 2023-08-20 NOTE — PROGRESS NOTES
Name: Andrea Zepeda ADMIT: 2023   : 1955  PCP: Yaquelin Ovalle MD    MRN: [id number] LOS: 0 days   AGE/SEX: 67 y.o. male  ROOM: [facility]     Subjective   Subjective   Up in his chair. He continues to c/o L sided pain- improving though. States he thinks it may be related to him leaning over too far on his riding  last week. He showed me pictures of his stool from last night which look less bloody than then pictures he showed me of his stool prior to arrival.       Objective   Objective   Vital Signs  Temp:  [96.4 øF (35.8 øC)-98.4 øF (36.9 øC)] 98.4 øF (36.9 øC)  Heart Rate:  [56-68] 57  Resp:  [18] 18  BP: (140-176)/(80-98) 147/96  SpO2:  [98 %-100 %] 98 %  on   ;   Device (Oxygen Therapy): room air  Body mass index is 27.88 kg/mý.  Physical Exam  Constitutional:       General: He is not in acute distress.     Appearance: He is not toxic-appearing.   Cardiovascular:      Rate and Rhythm: Normal rate and regular rhythm.   Pulmonary:      Effort: Pulmonary effort is normal. No respiratory distress.      Breath sounds: Normal breath sounds. No wheezing.   Abdominal:      General: Abdomen is flat. Bowel sounds are normal. There is no distension.      Palpations: Abdomen is soft.      Tenderness: There is abdominal tenderness.   Musculoskeletal:         General: No tenderness.      Right lower leg: No edema.      Left lower leg: No edema.   Skin:     General: Skin is warm and dry.   Neurological:      General: No focal deficit present.      Mental Status: He is alert and oriented to person, place, and time. Mental status is at baseline.      Motor: No weakness.       Results Review     I reviewed the patient's new clinical results.  Results from last 7 days   Lab Units 23  0344 23  0419 23  0055 23  1230   WBC 10*3/mm3 7.40 8.50  --  9.93   HEMOGLOBIN g/dL 13.2 13.5 14.1 15.4   PLATELETS 10*3/mm3 156 164  --  192       Results from last 7 days   Lab Units  08/20/23  0344 08/19/23  0419 08/18/23  1230   SODIUM mmol/L 140 139 138   POTASSIUM mmol/L 3.7 3.7 3.7   CHLORIDE mmol/L 109* 108* 104   CO2 mmol/L 21.5* 23.7 24.0   BUN mg/dL 12 16 17   CREATININE mg/dL 0.69* 0.69* 0.78   GLUCOSE mg/dL 79 93 101*     Estimated Creatinine Clearance: 104.2 mL/min (A) (by C-G formula based on SCr of 0.69 mg/dL (L)).  Results from last 7 days   Lab Units 08/18/23  1230   ALBUMIN g/dL 4.7   BILIRUBIN mg/dL 0.6   ALK PHOS U/L 38*   AST (SGOT) U/L 27   ALT (SGPT) U/L 30       Results from last 7 days   Lab Units 08/20/23  0344 08/19/23  0419 08/18/23  1230   CALCIUM mg/dL 8.6 8.6 9.6   ALBUMIN g/dL  --   --  4.7   MAGNESIUM mg/dL 2.2  --   --    PHOSPHORUS mg/dL 2.2*  --   --        Results from last 7 days   Lab Units 08/19/23  0055   LACTATE mmol/L 0.9     No results found for: COVID19  No results found for: HGBA1C, POCGLU    Duplex Mesenteric Complete CAR    No hemodynamically significant stenosis of the celiac artery.    No hemodynamically significant stenosis of the superior mesenteric   artery.    The inferior mesenteric artery is patent.    Circumferential hypoechoic area surrounding the infrarenal aorta,   significance unknown.    Scheduled Medications  cefTRIAXone, 1,000 mg, Intravenous, Q24H  metroNIDAZOLE, 500 mg, Intravenous, Q8H  senna-docusate sodium, 2 tablet, Oral, BID  valsartan, 160 mg, Oral, Q24H    Infusions     Diet  Diet: Liquid Diets; Clear Liquid; Texture: Regular Texture (IDDSI 7); Fluid Consistency: Thin (IDDSI 0)         I have personally reviewed:  [x]  Laboratory   []  Microbiology   [x]  Radiology   [x]  EKG/Telemetry   []  Cardiology/Vascular   []  Pathology   []  Records    Assessment/Plan     Active Hospital Problems    Diagnosis  POA    **Colitis [K52.9]  Yes    Hirschsprung's disease [Q43.1]  Not Applicable    LLQ abdominal pain [R10.32]  Yes    Essential hypertension, benign [I10]  Yes    Rectal bleeding [K62.5]  Yes      Resolved Hospital Problems    No resolved problems to display.       67 y.o. male admitted with Colitis.    Colitis  Rectal bleeding/diarrhea  Hirschprung's disease  Left lower quadrant pain  - CT reviewed, colitis area suspicious for ischemic cause given location  - GI consulted, pt w/ anaphylaxis to CT contrast so mesenteric duplex ordered  - mesenteric duplex negative for cause of colitis  - continue rocephin/flagyl; stool studies negative    Hypertension  - restart ARB for uncontrolled BP    SCDs for DVT prophylaxis.  Full code.  Discussed with patient and nursing staff.  Anticipate discharge home timing yet to be determined.      Claudia Reza MD  [facility]  08/20/23  11:41 EDT    I wore protective equipment throughout this patient encounter including gloves.  Hand hygiene was performed before donning protective equipment and after removal when leaving the room.         Copied text in this note has been reviewed and is accurate as of 08/20/23.

## 2023-08-20 NOTE — PLAN OF CARE
Goal Outcome Evaluation:  Plan of Care Reviewed With: patient        Progress: no change  Outcome Evaluation: No stool today. Pt had a little left lower quad soreness but no major pain. Pt tolerated clear liquids for dinner. Pt oob a lot without distress or injury.

## 2023-08-21 VITALS
HEIGHT: 67 IN | BODY MASS INDEX: 27.94 KG/M2 | OXYGEN SATURATION: 98 % | WEIGHT: 178 LBS | RESPIRATION RATE: 18 BRPM | DIASTOLIC BLOOD PRESSURE: 87 MMHG | HEART RATE: 60 BPM | TEMPERATURE: 98.4 F | SYSTOLIC BLOOD PRESSURE: 128 MMHG

## 2023-08-21 LAB
ANION GAP SERPL CALCULATED.3IONS-SCNC: 7.8 MMOL/L (ref 5–15)
BASOPHILS # BLD AUTO: 0.03 10*3/MM3 (ref 0–0.2)
BASOPHILS NFR BLD AUTO: 0.5 % (ref 0–1.5)
BUN SERPL-MCNC: 9 MG/DL (ref 8–23)
BUN/CREAT SERPL: 11.4 (ref 7–25)
CALCIUM SPEC-SCNC: 9 MG/DL (ref 8.6–10.5)
CHLORIDE SERPL-SCNC: 109 MMOL/L (ref 98–107)
CO2 SERPL-SCNC: 22.2 MMOL/L (ref 22–29)
CREAT SERPL-MCNC: 0.79 MG/DL (ref 0.76–1.27)
DEPRECATED RDW RBC AUTO: 39.1 FL (ref 37–54)
EGFRCR SERPLBLD CKD-EPI 2021: 96.8 ML/MIN/1.73
EOSINOPHIL # BLD AUTO: 0.23 10*3/MM3 (ref 0–0.4)
EOSINOPHIL NFR BLD AUTO: 4.2 % (ref 0.3–6.2)
ERYTHROCYTE [DISTWIDTH] IN BLOOD BY AUTOMATED COUNT: 12.8 % (ref 12.3–15.4)
GLUCOSE SERPL-MCNC: 98 MG/DL (ref 65–99)
HCT VFR BLD AUTO: 39.4 % (ref 37.5–51)
HGB BLD-MCNC: 13.2 G/DL (ref 13–17.7)
IMM GRANULOCYTES # BLD AUTO: 0.01 10*3/MM3 (ref 0–0.05)
IMM GRANULOCYTES NFR BLD AUTO: 0.2 % (ref 0–0.5)
LYMPHOCYTES # BLD AUTO: 1.74 10*3/MM3 (ref 0.7–3.1)
LYMPHOCYTES NFR BLD AUTO: 31.9 % (ref 19.6–45.3)
MAGNESIUM SERPL-MCNC: 2.2 MG/DL (ref 1.6–2.4)
MCH RBC QN AUTO: 28.5 PG (ref 26.6–33)
MCHC RBC AUTO-ENTMCNC: 33.5 G/DL (ref 31.5–35.7)
MCV RBC AUTO: 85.1 FL (ref 79–97)
MONOCYTES # BLD AUTO: 0.43 10*3/MM3 (ref 0.1–0.9)
MONOCYTES NFR BLD AUTO: 7.9 % (ref 5–12)
NEUTROPHILS NFR BLD AUTO: 3.02 10*3/MM3 (ref 1.7–7)
NEUTROPHILS NFR BLD AUTO: 55.3 % (ref 42.7–76)
NRBC BLD AUTO-RTO: 0 /100 WBC (ref 0–0.2)
PHOSPHATE SERPL-MCNC: 2.2 MG/DL (ref 2.5–4.5)
PLATELET # BLD AUTO: 162 10*3/MM3 (ref 140–450)
PMV BLD AUTO: 11.3 FL (ref 6–12)
POTASSIUM SERPL-SCNC: 3.7 MMOL/L (ref 3.5–5.2)
RBC # BLD AUTO: 4.63 10*6/MM3 (ref 4.14–5.8)
SODIUM SERPL-SCNC: 139 MMOL/L (ref 136–145)
WBC NRBC COR # BLD: 5.46 10*3/MM3 (ref 3.4–10.8)

## 2023-08-21 PROCEDURE — G0378 HOSPITAL OBSERVATION PER HR: HCPCS

## 2023-08-21 PROCEDURE — 85025 COMPLETE CBC W/AUTO DIFF WBC: CPT | Performed by: STUDENT IN AN ORGANIZED HEALTH CARE EDUCATION/TRAINING PROGRAM

## 2023-08-21 PROCEDURE — 25010000002 CEFTRIAXONE PER 250 MG: Performed by: INTERNAL MEDICINE

## 2023-08-21 PROCEDURE — 80048 BASIC METABOLIC PNL TOTAL CA: CPT | Performed by: STUDENT IN AN ORGANIZED HEALTH CARE EDUCATION/TRAINING PROGRAM

## 2023-08-21 PROCEDURE — 83735 ASSAY OF MAGNESIUM: CPT | Performed by: STUDENT IN AN ORGANIZED HEALTH CARE EDUCATION/TRAINING PROGRAM

## 2023-08-21 PROCEDURE — 84100 ASSAY OF PHOSPHORUS: CPT | Performed by: STUDENT IN AN ORGANIZED HEALTH CARE EDUCATION/TRAINING PROGRAM

## 2023-08-21 PROCEDURE — 25010000002 METRONIDAZOLE 500 MG/100ML SOLUTION: Performed by: INTERNAL MEDICINE

## 2023-08-21 RX ORDER — AMOXICILLIN AND CLAVULANATE POTASSIUM 875; 125 MG/1; MG/1
1 TABLET, FILM COATED ORAL 2 TIMES DAILY
Qty: 4 TABLET | Refills: 0 | Status: SHIPPED | OUTPATIENT
Start: 2023-08-21 | End: 2023-08-23

## 2023-08-21 RX ADMIN — METRONIDAZOLE 500 MG: 500 INJECTION, SOLUTION INTRAVENOUS at 00:45

## 2023-08-21 RX ADMIN — CEFTRIAXONE SODIUM 1000 MG: 1 INJECTION, POWDER, FOR SOLUTION INTRAMUSCULAR; INTRAVENOUS at 00:45

## 2023-08-21 RX ADMIN — VALSARTAN 160 MG: 160 TABLET, FILM COATED ORAL at 08:38

## 2023-08-21 RX ADMIN — METRONIDAZOLE 500 MG: 500 INJECTION, SOLUTION INTRAVENOUS at 08:38

## 2023-08-21 NOTE — CASE MANAGEMENT/SOCIAL WORK
Continued Stay Note  [facility]     Patient Name: Andrea Zepeda  MRN: [id number]  Today's Date: 8/21/2023    Admit Date: 8/18/2023    Plan: Home, pt drove self to hospital   Discharge Plan       Row Name 08/21/23 1053       Plan    Plan Home, pt drove self to hospital    Plan Comments Met with pt at bedside. Introduced self and explained role of . Pt is dc this day. Pt denies any needs. Pt is independent in ADL's, drove self to hospital. Denies the need for any  assistive devices. Pt obtains his medications from [facility] in [place] and denies any difficulty paying for his medications. Advised pt that CCP is available should any further needs arise.                   Discharge Codes    No documentation.                 Expected Discharge Date and Time       Expected Discharge Date Expected Discharge Time    Aug 21, 2023               Nakita Villanueva RN

## 2023-08-21 NOTE — PLAN OF CARE
Problem: Adult Inpatient Plan of Care  Goal: Absence of Hospital-Acquired Illness or Injury  Intervention: Identify and Manage Fall Risk  Description: Perform standard risk assessment on admission using a validated tool or comprehensive approach appropriate to the patient; reassess fall risk frequently, with change in status or transfer to another level of care.  Communicate fall injury risk to interprofessional healthcare team.  Determine need for increased observation, equipment and environmental modification, such as low bed, signage and supportive, nonskid footwear.  Adjust safety measures to individual developmental age, stage and identified risk factors.  Reinforce the importance of safety and physical activity with patient and family.  Perform regular intentional rounding to assess need for position change, pain assessment and personal needs, including assistance with toileting.  Intervention: Prevent Skin Injury  Description: Perform a screening for skin injury risk, such as pressure or moisture associated skin damage on admission and at regular intervals throughout hospital stay.  Keep all areas of skin (especially folds) clean and dry.  Maintain adequate skin hydration.  Relieve and redistribute pressure and protect bony prominences; implement measures based on patient-specific risk factors.  Match turning and repositioning schedule to clinical condition.  Encourage weight shift frequently; assist with reposition if unable to complete independently.  Float heels off bed; avoid pressure on the Achilles tendon.  Keep skin free from extended contact with medical devices.  Encourage functional activity and mobility, as early as tolerated.  Use aids (e.g., slide boards, mechanical lift) during transfer.  Intervention: Prevent and Manage VTE (Venous Thromboembolism) Risk  Description: Assess for VTE (venous thromboembolism) risk.  Encourage and assist with early ambulation.  Initiate and maintain compression  or other therapy, as indicated, based on identified risk in accordance with organizational protocol and provider order.  Encourage both active and passive leg exercises while in bed, if unable to ambulate.  Intervention: Prevent Infection  Description: Maintain skin and mucous membrane integrity; promote hand, oral and pulmonary hygiene.  Optimize fluid balance, nutrition, sleep and glycemic control to maximize infection resistance.  Identify potential sources of infection early to prevent or mitigate progression of infection (e.g., wound, lines, devices).  Evaluate ongoing need for invasive devices; remove promptly when no longer indicated.   Goal Outcome Evaluation:

## 2023-08-21 NOTE — CASE MANAGEMENT/SOCIAL WORK
Case Management Discharge Note      Final Note: Home, pt drove self         Selected Continued Care - Discharged on 8/21/2023 Admission date: 8/18/2023 - Discharge disposition: Home or Self Care      Destination    No services have been selected for the patient.                Durable Medical Equipment    No services have been selected for the patient.                Dialysis/Infusion    No services have been selected for the patient.                Home Medical Care    No services have been selected for the patient.                Therapy    No services have been selected for the patient.                Community Resources    No services have been selected for the patient.                Community & DME    No services have been selected for the patient.                    Transportation Services  Private: Car    Final Discharge Disposition Code: 01 - home or self-care

## 2023-08-21 NOTE — PLAN OF CARE
Goal Outcome Evaluation:  Plan of Care Reviewed With: patient        Progress: improving  Outcome Evaluation: pt tolerated solid food for dinner. Pt denies abd pain at shift change. Pt oob most of day without distress noted.

## 2023-08-21 NOTE — DISCHARGE SUMMARY
Patient Name: Andrea Zepeda  : 1955  MRN: [id number]    Date of Admission: 2023  Date of Discharge:  2023  Primary Care Physician: Yaquelin Ovalle MD      Chief Complaint:   Diarrhea and Abdominal Pain      Discharge Diagnoses     Active Hospital Problems    Diagnosis  POA    **Colitis [K52.9]  Yes    Hirschsprung's disease [Q43.1]  Not Applicable    LLQ abdominal pain [R10.32]  Yes    Essential hypertension, benign [I10]  Yes    Rectal bleeding [K62.5]  Yes      Resolved Hospital Problems   No resolved problems to display.        Hospital Course     Mr. Zepeda is a 67 y.o. male with a history of Hirschsprung's disease and hypertension who presented to [facility] initially complaining of rectal bleeding and left lower quadrant abdominal pain.  Please see the admitting history and physical for further details.  He was found to have colitis and was admitted to the hospital for further evaluation and treatment.  The patient was admitted, started on broad-spectrum antibiotics, clear liquid diet and a GI consult was requested.  Given the distribution of his colitis (from the splenic flexure/the majority of the descending colon) being consistent with ischemic colitis he underwent further imaging.  The patient has an anaphylactic allergy to IV contrast so a mesenteric duplex was obtained instead.  Mesenteric duplex did not reveal any hemodynamically significant stenosis of the celiac, superior mesenteric or inferior mesenteric artery.  Stool studies/C. difficile panel were negative.  The patient's rectal bleeding improved, his diet was advanced and he is tolerating a soft diet at the time of discharge.  GI is recommending he stay hydrated at home, continue bowel regimen and follow-up with his outpatient surgeon Dr. Finley in 12 weeks for reassessment.  Patient will discharge on Augmentin to complete a 5-day course of antibiotics.  He has also been instructed to follow-up with PCP  in a week for posthospital follow-up.    Day of Discharge     Subjective:  Sitting up in his chair, fully dressed and wants to go home.    Physical Exam:  Temp:  [97.9 øF (36.6 øC)-98.4 øF (36.9 øC)] 98.4 øF (36.9 øC)  Heart Rate:  [49-60] 60  Resp:  [18] 18  BP: (127-141)/(70-87) 128/87  Body mass index is 27.88 kg/mý.  Physical Exam  Constitutional:       General: He is not in acute distress.     Appearance: He is not toxic-appearing.   Cardiovascular:      Rate and Rhythm: Normal rate and regular rhythm.   Pulmonary:      Effort: Pulmonary effort is normal. No respiratory distress.      Breath sounds: Normal breath sounds. No wheezing.   Abdominal:      General: Abdomen is flat. Bowel sounds are normal. There is no distension.      Palpations: Abdomen is soft.      Tenderness: There is no abdominal tenderness.   Musculoskeletal:         General: No tenderness.      Right lower leg: No edema.      Left lower leg: No edema.   Skin:     General: Skin is warm and dry.   Neurological:      General: No focal deficit present.      Mental Status: He is alert and oriented to person, place, and time. Mental status is at baseline.      Motor: No weakness.       Consultants     Consult Orders (all) (From admission, onward)       Start     Ordered    08/18/23 1746  Inpatient Gastroenterology Consult  Once        Specialty:  Gastroenterology  Provider:  Valentin Otoole MD    08/18/23 1745    08/18/23 1521  [facility] (on-call MD unless specified) Details  Once        Specialty:  Hospitalist  Provider:  Gayle Moody MD    08/18/23 1520                  Procedures     Imaging Results (All)       Procedure Component Value Units Date/Time    CT Abdomen Pelvis Without Contrast [[id number]] Collected: 08/18/23 1451     Updated: 08/18/23 1511    Narrative:      CT ABDOMEN AND PELVIS WITHOUT IV CONTRAST     HISTORY: Bloody diarrhea     TECHNIQUE: Radiation dose reduction techniques were utilized, including  automated exposure  control and exposure modulation based on body size.   3 mm images were obtained through the abdomen and pelvis without the  administration of IV contrast.      COMPARISON: CT abdomen pelvis 5/30/2023     FINDINGS: New focal confluent moderate to severe circumferential wall  thickening involving the colonic splenic flexure and proximal most  descending colon. Adjacent inflammatory changes in the mesentery. No  pneumatosis, portal venous gas or free intraperitoneal air. Remaining  bowel is normal in appearance. Severely atherosclerotic abdominal aorta  with unchanged infrarenal ectasia (2.9 cm).     Stable small bilateral adrenal adenomas. Multiple bilateral  nonobstructing renal calculi. Remaining solid organs are normal. No  abdominal pelvic lymphadenopathy. No focal osseous lesion.       Impression:      Focal moderate to severe colitis involving the splenic  flexure and proximal most descending colon. Given focality and watershed  area, ischemic colitis is of greatest concern. Evaluation is  significantly limited without intravenous contrast. No pneumatosis,  portal venous gas or free intraperitoneal air. Consider multiphase  contrast-enhanced CT for further evaluation of the mesenteric  vasculature.     Above findings were communicated to Dr. Toribio at 3:08 p.m. on  8/18/2023.     This report was finalized on 8/18/2023 3:08 PM by Dr. Teo Roman M.D.               Pertinent Labs     Results from last 7 days   Lab Units 08/21/23  0431 08/20/23  0344 08/19/23  0419 08/19/23  0055 08/18/23  1230   WBC 10*3/mm3 5.46 7.40 8.50  --  9.93   HEMOGLOBIN g/dL 13.2 13.2 13.5 14.1 15.4   PLATELETS 10*3/mm3 162 156 164  --  192     Results from last 7 days   Lab Units 08/21/23  0431 08/20/23  0344 08/19/23  0419 08/18/23  1230   SODIUM mmol/L 139 140 139 138   POTASSIUM mmol/L 3.7 3.7 3.7 3.7   CHLORIDE mmol/L 109* 109* 108* 104   CO2 mmol/L 22.2 21.5* 23.7 24.0   BUN mg/dL 9 12 16 17   CREATININE mg/dL 0.79 0.69* 0.69*  "0.78   GLUCOSE mg/dL 98 79 93 101*   Estimated Creatinine Clearance: 91 mL/min (by C-G formula based on SCr of 0.79 mg/dL).  Results from last 7 days   Lab Units 23  1230   ALBUMIN g/dL 4.7   BILIRUBIN mg/dL 0.6   ALK PHOS U/L 38*   AST (SGOT) U/L 27   ALT (SGPT) U/L 30     Results from last 7 days   Lab Units 23  0431 23  0344 23  0419 23  1230   CALCIUM mg/dL 9.0 8.6 8.6 9.6   ALBUMIN g/dL  --   --   --  4.7   MAGNESIUM mg/dL 2.2 2.2  --   --    PHOSPHORUS mg/dL 2.2* 2.2*  --   --      Results from last 7 days   Lab Units 23  1230   LIPASE U/L 13             Invalid input(s): LDLCALC  Results from last 7 days   Lab Units 23  0055   BLOODCX  No growth at 2 days  No growth at 2 days       Test Results Pending at Discharge     Pending Labs       Order Current Status    Blood Culture - Blood, Arm, Left Preliminary result    Blood Culture - Blood, Hand, Right Preliminary result            Discharge Details        Discharge Medications        New Medications        Instructions Start Date   amoxicillin-clavulanate 875-125 MG per tablet  Commonly known as: AUGMENTIN   1 tablet, Oral, 2 Times Daily             Continue These Medications        Instructions Start Date   valsartan 160 MG tablet  Commonly known as: DIOVAN   TAKE 1 TABLET BY MOUTH ONE TIME EACH DAY.               Allergies   Allergen Reactions    Iodinated Contrast Media Anaphylaxis     \"Throat swelled shut and I almost \"    Morphine And Related     Sulfa Antibiotics          Discharge Disposition:  Home or Self Care    Discharge Diet:  Diet Order   Procedures    Diet: Gastrointestinal Diets; Fiber-Restricted; Texture: Soft to Chew (NDD 3); Soft to Chew: Whole Meat; Fluid Consistency: Thin (IDDSI 0)       Discharge Activity:   Activity Instructions       Activity as Tolerated              CODE STATUS:    Code Status and Medical Interventions:   Ordered at: 23 1350     Code Status (Patient has no pulse and " is not breathing):    CPR (Attempt to Resuscitate)     Medical Interventions (Patient has pulse or is breathing):    Full       No future appointments.  Additional Instructions for the Follow-ups that You Need to Schedule       Discharge Follow-up with PCP   As directed       Currently Documented PCP:    Yaquelin Ovalle MD    PCP Phone Number:    [phone]     Follow Up Details: post-hospital follow up        Discharge Follow-up with Specified Provider: GI; 3 Months   As directed      To: GI   Follow Up: 3 Months               Follow-up Information       Yaquelin Ovalle MD .    Specialty: Family Medicine  Why: one week hospital follow up *the office has been notiifed of the need     the will call you with the appointment * date and time  Contact information:  [address] [facility] [address] [phone]               Yaquelin Ovalle MD .    Specialty: Family Medicine  Why: post-hospital follow up  Contact information:  [address] [place] [address] [phone]                             Additional Instructions for the Follow-ups that You Need to Schedule       Discharge Follow-up with PCP   As directed       Currently Documented PCP:    Yaquelin Ovalle MD    PCP Phone Number:    [phone]     Follow Up Details: post-hospital follow up        Discharge Follow-up with Specified Provider: GI; 3 Months   As directed      To: GI   Follow Up: 3 Months            Time Spent on Discharge:  I spent greater than 30 minutes on this discharge activity which included: face-to-face encounter with the patient, reviewing the data in the system, coordination of the care with the nursing staff as well as consultants, documentation, and entering orders.       Claudia Reza MD  [facility]  08/21/23  14:58 EDT

## 2023-08-22 ENCOUNTER — READMISSION MANAGEMENT (OUTPATIENT)
Dept: CALL CENTER | Facility: HOSPITAL | Age: 68
End: 2023-08-22
Payer: MEDICARE

## 2023-08-22 NOTE — OUTREACH NOTE
Prep Survey      Flowsheet Row Responses   [facility] facility patient discharged from? [place]   Is LACE score < 7 ? No   Eligibility Readm Mgmt   Discharge diagnosis Colitis   Does the patient have one of the following disease processes/diagnoses(primary or secondary)? Other   Does the patient have Home health ordered? No   Is there a DME ordered? No   Prep survey completed? Yes            Lilo YEN - Registered Nurse

## 2023-08-24 LAB
BACTERIA SPEC AEROBE CULT: NORMAL
BACTERIA SPEC AEROBE CULT: NORMAL

## 2023-08-25 ENCOUNTER — READMISSION MANAGEMENT (OUTPATIENT)
Dept: CALL CENTER | Facility: HOSPITAL | Age: 68
End: 2023-08-25
Payer: MEDICARE

## 2023-08-25 NOTE — OUTREACH NOTE
Medical Week 1 Survey      Flowsheet Row Responses   [facility] patient discharged from? [place]   Does the patient have one of the following disease processes/diagnoses(primary or secondary)? Other   Week 1 attempt successful? Yes   Call start time 1335   Call end time 1346   Discharge diagnosis Colitis   Person spoke with today (if not patient) and relationship pt   Meds reviewed with patient/caregiver? Yes   Is the patient having any side effects they believe may be caused by any medication additions or changes? No   Does the patient have all medications ordered at discharge? Yes   Is the patient taking all medications as directed (includes completed medication regime)? Yes   Does the patient have a primary care provider?  Yes   Does the patient have an appointment with their PCP within 7 days of discharge? Yes   Has the patient kept scheduled appointments due by today? Yes   Psychosocial issues? No   Did the patient receive a copy of their discharge instructions? Yes   Nursing interventions Reviewed instructions with patient   What is the patient's perception of their health status since discharge? Improving   Is the patient/caregiver able to teach back signs and symptoms related to disease process for when to call PCP? Yes   Is the patient/caregiver able to teach back signs and symptoms related to disease process for when to call 911? Yes   Is the patient/caregiver able to teach back the hierarchy of who to call/visit for symptoms/problems? PCP, Specialist, Home health nurse, Urgent Care, ED, 911 Yes   If the patient is a current smoker, are they able to teach back resources for cessation? Not a smoker   Week 1 call completed? Yes   Would this patient benefit from a Referral to Amb Social Work? No   Is the patient interested in additional calls from an ambulatory ? No   Wrap up additional comments Pt states he is doing better, and reports he has not had a BM since Sunday, 8/20/23. Pt  advised to drink apple juice after each meal,  pt shares he does take metamucil. Pt advised to call PCP office today to ask what PCP recommends for constipation. Reviewed AVS/meds with pt.   Call end time [id number]            Annalee PARDO - Registered Nurse